# Patient Record
Sex: FEMALE | Race: WHITE | NOT HISPANIC OR LATINO | Employment: FULL TIME | URBAN - METROPOLITAN AREA
[De-identification: names, ages, dates, MRNs, and addresses within clinical notes are randomized per-mention and may not be internally consistent; named-entity substitution may affect disease eponyms.]

---

## 2017-05-31 DIAGNOSIS — J44.9 CHRONIC OBSTRUCTIVE PULMONARY DISEASE, UNSPECIFIED COPD TYPE (H): Primary | ICD-10-CM

## 2017-06-01 NOTE — TELEPHONE ENCOUNTER
albuterol (PROAIR HFA, PROVENTIL HFA, VENTOLIN HFA) 108 (90 BASE) MCG/ACT inhaler       Last Written Prescription Date: 9/22/15  Last Fill Quantity: 2 inhalers, # refills: 5    Last Office Visit with [facility] [place] [place], [facility] or [facility] prescribing provider:  6/6/16   Future Office Visit:       Date of Last Asthma Action Plan Letter:   There are no preventive care reminders to display for this patient.   Asthma Control Test: No flowsheet data found.    Date of Last Spirometry Test:   No results found for this or any previous visit.

## 2017-06-02 RX ORDER — ALBUTEROL SULFATE 90 UG/1
AEROSOL, METERED RESPIRATORY (INHALATION)
Qty: 36 G | Refills: 0 | Status: SHIPPED | OUTPATIENT
Start: 2017-06-02 | End: 2019-04-25

## 2017-06-02 NOTE — TELEPHONE ENCOUNTER
Patient needs to be scheduled for clinic visit. Ventolin refill ×1. Has not been seen since June 2016. Please assist in scheduling.    Jamie Melo MD  Please close encounter when call/work completed.

## 2017-06-02 NOTE — TELEPHONE ENCOUNTER
Routing refill request to provider for review/approval because:  Script is from 9/2015    Kari Valdez, RN, BSN

## 2019-03-11 ENCOUNTER — TELEPHONE (OUTPATIENT)
Dept: FAMILY MEDICINE | Facility: CLINIC | Age: 59
End: 2019-03-11

## 2019-03-11 NOTE — TELEPHONE ENCOUNTER
Reason for Call:  Other     Detailed comments: Norberto calling from [facility] states pt had purchased a oxygen machine for travel via telephone. [facility] is needing office notes stating that Yarely is on oxygen for COPD. Please advise      ED01 phone number [phone]    Fax [phone]    Phone Number Patient can be reached at: Home number on file [phone] (home)    Best Time: ANY    Can we leave a detailed message on this number? YES    Call taken on 3/11/2019 at 2:28 PM by Ijeoma Ramesh

## 2019-03-12 ENCOUNTER — TELEPHONE (OUTPATIENT)
Dept: FAMILY MEDICINE | Facility: CLINIC | Age: 59
End: 2019-03-12

## 2019-03-12 NOTE — TELEPHONE ENCOUNTER
Reason for call:  Form  Reason for Call:  Form, our goal is to have forms completed with 72 hours, however, some forms may require a visit or additional information.    Type of letter, form or note:  Medical    Who is the form from?: [employer]      Where did the form come from: form was faxed in    What clinic location was the form placed at?: [facility] - [phone]    Where the form was placed: 's in box     What number is listed as a contact on the form?: [phone]       Additional comments: Fax back to [phone]    Call taken on 3/12/2019 at 3:22 PM by Tien Kauffman

## 2019-04-23 NOTE — PROGRESS NOTES
SUBJECTIVE:   Yarely Dia is a 58 year old female who presents to clinic today for the following health issues:    History of Present Illness     COPD:     Current status of COPD symptom::  Slightly worsened    Status of fatigue and dyspnea with ambulation::  Better than usual    Status of dyspnea::  Slightly worsened    Increase or change in cough or sputum::  No    Fever::  No    Baseline ambulation without stopping to rest::  Less than 1 block    Number of flights of stairs without resting::  1    ER or UC Visits or Hospital admissions::  None    Diet:  Regular (no restrictions)  Taking medications regularly:  Yes  Additional concerns today:  No    Her son has noticed that her breathing is worse in the evening/night, which is why she got oxygen to sleep at night.   She sometimes feels depressed. Her breathing is her worst concern, she has noticed that some foods work/effect her breathing differently.     Hypertension- she would like to work on her weight vs starting on the pills. Denies chest pain, but positive for SOB. Her right foot is numb, due to her back.   Pt was supposed to see chiropractor about a month ago, but did not make it. Pt states that spearmint gum takes her lungs spasms away. Denies vaginal bleeding.     Ear  Pt states that her left ear has been bothering her a lot.     Tobacco abuse:  She is still smoking 1-1.5 packs a day. Pt quit for about a month, but has restarted after going back to work.     Social History  Pt's daughter was living on the streets, which is why she lost the custody of 2 of her daughters. She is still keeping her distance from her children. Pt is working 3 times a week.     Additional history: as documented    Reviewed and updated as needed this visit by clinical staff  Tobacco  Allergies  Meds  Med Hx  Surg Hx  Fam Hx  Soc Hx        Reviewed and updated as needed this visit by Provider       Patient Active Problem List   Diagnosis     Tobacco abuse     Cough      CARDIOVASCULAR SCREENING; LDL GOAL LESS THAN 130     Tingling in extremities     COPD (chronic obstructive pulmonary disease) (H)     Essential hypertension     Obesity (BMI 35.0-39.9) with comorbidity (H)     Past Surgical History:   Procedure Laterality Date     BACK SURGERY  1999    L4, L5, S1 fusion     BACK SURGERY  1997    herniated disc     GYN SURGERY  2007    hysterectomy- total and oophrectomy, uterine cancer     HEAD & NECK SURGERY  1969    tonsilectomy     HYSTERECTOMY, PAP NO LONGER INDICATED         Social History     Tobacco Use     Smoking status: Current Every Day Smoker     Packs/day: 1.00     Years: 39.00     Pack years: 39.00     Types: Cigarettes     Start date: 6/1/1979     Smokeless tobacco: Never Used   Substance Use Topics     Alcohol use: No     Family History   Problem Relation Age of Onset     C.A.D. Mother      Diabetes Mother      Hypertension Mother      Cancer Mother      C.A.D. Father      Diabetes Father      Hypertension Father      Gastrointestinal Disease Father         colon polyps     C.A.D. Maternal Grandmother      Diabetes Maternal Grandmother      Diabetes Maternal Grandfather      C.A.D. Maternal Grandfather      C.A.D. Paternal Grandmother      Diabetes Paternal Grandmother      Cancer Paternal Grandmother         lung CA     Diabetes Paternal Grandfather      Alzheimer Disease Maternal Aunt      Coronary Artery Disease No family hx of      Hyperlipidemia No family hx of      Ovarian Cancer No family hx of      Prostate Cancer No family hx of      Depression/Anxiety No family hx of      Thyroid Disease No family hx of      Asthma No family hx of      Known Genetic Syndrome No family hx of      Breast Cancer No family hx of      Cancer - colorectal No family hx of      Cerebrovascular Disease No family hx of      Anesthesia Reaction No family hx of      Osteoporosis No family hx of      Chemical Addiction No family hx of          Current Outpatient Medications   Medication  "Sig Dispense Refill     albuterol (VENTOLIN HFA) 108 (90 Base) MCG/ACT inhaler USE 2 PUFFS BY MOUTH INTO THE LUNGS EVERY 4 HOURS AS NEEDED FOR SHORTNESS OF BREATH/ DYSPNEA 36 g 3     Flaxseed, Linseed, (FLAXSEED OIL) 1000 MG CAPS Take 500 mg by mouth       fluticasone (FLOVENT DISKUS) 100 MCG/BLIST inhaler Inhale 1 puff into the lungs 2 times daily 1 each 11     IBUPROFEN PO Take  by mouth daily as needed.       ipratropium - albuterol 0.5 mg/2.5 mg/3 mL (DUONEB) 0.5-2.5 (3) MG/3ML nebulization Take 1 vial (3 mLs) by nebulization every 6 hours as needed for shortness of breath / dyspnea or wheezing 90 vial 3     lisinopril (PRINIVIL/ZESTRIL) 20 MG tablet Take 1 tablet (20 mg) by mouth daily 30 tablet 0     tiotropium (SPIRIVA HANDIHALER) 18 MCG inhaled capsule Inhale contents of one capsule daily. 90 capsule 3     No Known Allergies  Recent Labs   Lab Test 05/29/12  0800      HDL 43*   TRIG 118   CR 0.56   GFRESTIMATED >90   GFRESTBLACK >90   POTASSIUM 4.1      BP Readings from Last 3 Encounters:   04/25/19 150/74   06/06/16 136/86   09/22/15 130/86    Wt Readings from Last 3 Encounters:   04/25/19 88.9 kg (195 lb 14.4 oz)   06/06/16 82.6 kg (182 lb 1.6 oz)   09/22/15 83.4 kg (183 lb 14.4 oz)         Labs reviewed in EPIC    ROS:  Constitutional, neuro, ENT, endocrine, pulmonary, cardiac, gastrointestinal, genitourinary, musculoskeletal, integument and psychiatric systems are negative, except as otherwise noted.    This document serves as a record of the services and decisions personally performed and made by Susan Varma CNP. It was created on his/her behalf by Yung Mayer, trained medical scribe. The creation of this document is based the provider's statements to the medical scribes.    Charlie Mayer 1:53 PM, April 25, 2019    OBJECTIVE:                                                    /74   Pulse 95   Temp 97.9  F (36.6  C) (Temporal)   Resp 24   Ht 1.575 m (5' 2\")   Wt " 88.9 kg (195 lb 14.4 oz)   SpO2 92%   BMI 35.83 kg/m    Body mass index is 35.83 kg/m .  GENERAL APPEARANCE: healthy, alert and no distress  EYES: Eyes grossly normal to inspection, PERRL and conjunctivae and sclerae normal  HENT: ear canals and TM's normal and nose and mouth without ulcers or lesions  NECK: no adenopathy, no asymmetry, masses, or scars and thyroid normal to palpation  RESP: lungs clear to auscultation - no rales, rhonchi or wheezes  CV: regular rates and rhythm, normal S1 S2, no S3 or S4 and no murmur, click or rub  MS: extremities normal- no gross deformities noted  NEURO: Normal strength and tone, mentation intact and speech normal  PSYCH: mentation appears normal and affect normal/bright    Diagnostic test results:  No results found for this or any previous visit (from the past 24 hour(s)).     ASSESSMENT/PLAN:                                                        ICD-10-CM    1. Centrilobular emphysema (H) J43.2 COPD ACTION PLAN     fluticasone (FLOVENT DISKUS) 100 MCG/BLIST inhaler     tiotropium (SPIRIVA HANDIHALER) 18 MCG inhaled capsule   2. Tobacco use disorder F17.200 TOBACCO CESSATION ORDER FOR    3. Chronic obstructive pulmonary disease, unspecified COPD type (H) J44.9 albuterol (VENTOLIN HFA) 108 (90 Base) MCG/ACT inhaler   4. Personal history of tobacco use Z87.891 CT Chest Lung Cancer Scrn Low Dose wo   5. Essential hypertension I10 lisinopril (PRINIVIL/ZESTRIL) 20 MG tablet   6. Lumbar back pain with radiculopathy affecting right lower extremity M54.16 MR Lumbar Spine w/o Contrast   7. Morbid obesity (H) E66.01      Centrolobular emphysema- COPD action plan updated, will continue using Flovent Diskus 100MCG and Spiriva HandiHaler 18MCG. Refills ordered.      HTN- new diagnosis. Pt. Does not have good insurance- declined renal function today. Will re-assess with med on board in 1 month. Discussed weight loss, Lipid screening.     Tobacco Cessation- encouraged tobacco cessation,  pt does not want to be on Chantix, not wanting to quit at this time.     COPD- will continue treating with Ventolin  MCG, advised to continue using the oxygen machine.     Personal history of tobacco use- CT chest ordered. Info provided.     Hypertension- uncontrolled,strongly recommended she starts exercising and eating healthy foods to prevent being started on long term hypertension medication.     Follow up with Provider in 1 month for a BP recheck and med check.     The information in this document, created by the medical scribe for me, accurately reflects the services I personally performed and the decisions made by me. I have reviewed and approved this document for accuracy prior to leaving the patient care area.  Susan Varma CNP  2:18 PM, April 25, 2019    DEANNE Chen CNP  [facility]

## 2019-04-25 ENCOUNTER — OFFICE VISIT (OUTPATIENT)
Dept: FAMILY MEDICINE | Facility: CLINIC | Age: 59
End: 2019-04-25
Payer: COMMERCIAL

## 2019-04-25 VITALS
RESPIRATION RATE: 24 BRPM | TEMPERATURE: 97.9 F | SYSTOLIC BLOOD PRESSURE: 150 MMHG | DIASTOLIC BLOOD PRESSURE: 74 MMHG | BODY MASS INDEX: 36.05 KG/M2 | OXYGEN SATURATION: 92 % | HEIGHT: 62 IN | HEART RATE: 95 BPM | WEIGHT: 195.9 LBS

## 2019-04-25 DIAGNOSIS — J44.9 CHRONIC OBSTRUCTIVE PULMONARY DISEASE, UNSPECIFIED COPD TYPE (H): ICD-10-CM

## 2019-04-25 DIAGNOSIS — Z87.891 PERSONAL HISTORY OF TOBACCO USE: ICD-10-CM

## 2019-04-25 DIAGNOSIS — E66.01 MORBID OBESITY (H): ICD-10-CM

## 2019-04-25 DIAGNOSIS — F17.200 TOBACCO USE DISORDER: ICD-10-CM

## 2019-04-25 DIAGNOSIS — I10 ESSENTIAL HYPERTENSION: ICD-10-CM

## 2019-04-25 DIAGNOSIS — M54.16 LUMBAR BACK PAIN WITH RADICULOPATHY AFFECTING RIGHT LOWER EXTREMITY: ICD-10-CM

## 2019-04-25 DIAGNOSIS — J43.2 CENTRILOBULAR EMPHYSEMA (H): Primary | ICD-10-CM

## 2019-04-25 PROCEDURE — 99214 OFFICE O/P EST MOD 30 MIN: CPT | Performed by: NURSE PRACTITIONER

## 2019-04-25 RX ORDER — VITAMIN E 268 MG
500 CAPSULE ORAL
COMMUNITY

## 2019-04-25 RX ORDER — DIPHENHYDRAMINE HCL 25 MG
25 TABLET ORAL
COMMUNITY
Start: 2018-04-17 | End: 2019-04-25

## 2019-04-25 RX ORDER — ALBUTEROL SULFATE 90 UG/1
AEROSOL, METERED RESPIRATORY (INHALATION)
Qty: 36 G | Refills: 3 | Status: SHIPPED | OUTPATIENT
Start: 2019-04-25 | End: 2020-01-02

## 2019-04-25 RX ORDER — TIOTROPIUM BROMIDE 18 UG/1
CAPSULE ORAL; RESPIRATORY (INHALATION)
Qty: 90 CAPSULE | Refills: 3 | Status: SHIPPED | OUTPATIENT
Start: 2019-04-25 | End: 2020-05-14

## 2019-04-25 RX ORDER — LISINOPRIL 20 MG/1
20 TABLET ORAL DAILY
Qty: 30 TABLET | Refills: 0 | Status: SHIPPED | OUTPATIENT
Start: 2019-04-25 | End: 2019-06-11

## 2019-04-25 ASSESSMENT — MIFFLIN-ST. JEOR: SCORE: 1426.85

## 2019-04-25 ASSESSMENT — PAIN SCALES - GENERAL: PAINLEVEL: NO PAIN (0)

## 2019-04-25 NOTE — TELEPHONE ENCOUNTER
Reason for Call:  Medication or medication refill:    Do you use a [facility]?  Name of the pharmacy and phone number for the current request:  [facility] in [place]    Name of the medication requested: albuterol inhaler and solution    Other request: please re send Rx they have not received them.     Can we leave a detailed message on this number? YES    Phone number patient can be reached at: Home number on file [phone] (home)    Best Time: any    Call taken on 4/25/2019 at 4:32 PM by Radha Lopez

## 2019-04-25 NOTE — PATIENT INSTRUCTIONS

## 2019-04-25 NOTE — TELEPHONE ENCOUNTER
Left detailed message for pt.   Spoke to the pharmacy in [place] and was told to call the pharmacy in [place] to have them pulled the prescription and had fill there. When I called the [facility] in [place] they says they will call the patient to verify which inhaler patient want to filled.      John Levine MA

## 2019-04-25 NOTE — PROGRESS NOTES
Lung Cancer Screening Shared Decision Making Visit     Yarely Dia is eligible for lung cancer screening on the basis of the information provided in my signed lung cancer screening order.     I have discussed with patient the risks and benefits of screening for lung cancer with low-dose CT.     The risks include:  radiation exposure: one low dose chest CT has as much ionizing radiation as about 15 chest x-rays or 6 months of background radiation living in [place]    false positives: 96% of positive findings/nodules are NOT cancer, but some might still require additional diagnostic evaluation, including biopsy  over-diagnosis: some slow growing cancers that might never have been clinically significant will be detected and treated unnecessarily     The benefit of early detection of lung cancer is contingent upon adherence to annual screening or more frequent follow up if indicated.     Furthermore, reaping the benefits of screening requires Yarely Dia to be willing and physically able to undergo diagnostic procedures, if indicated. Although no specific guide is available for determining severity of comorbidities, it is reasonable to withhold screening in patients who have greater mortality risk from other diseases.     We did discuss that the only way to prevent lung cancer is to not smoke. Smoking cessation assistance was offered.    I did not offer risk estimation using a calculator such as this one:    ShouldIScreen

## 2019-04-25 NOTE — LETTER
My COPD Action Plan     Name: Yarely Dia    YOB: 1960   Date: 4/25/2019    My doctor: DEANNE Chen CNP   My clinic: [facility] [place]    [id number] [place] [address], [address]  [place] [address] [phone]  My Controller Medicine: Flovent  and Spiriva        My Rescue Medicine: Albuterol (Proair/Ventolin/Proventil) inhaler        My Flare Up Medicine: none ordered        My COPD Severity: Severe due to frequent exacerbations ( > 2 per year)      Use of Oxygen: titration- prev.orders Liters at night     Make sure you've had your pneumonia   vaccines.- done.           GREEN ZONE       Doing well today      Usual level of activity and exercise    Usual amount of cough and mucus    No shortness of breath    Usual level of health (thinking clearly, sleeping well, feel like eating) Actions:      Take daily medicines    Use oxygen as prescribed    Follow regular exercise and diet plan    Avoid cigarette smoke and other irritants that harm the lungs           YELLOW ZONE          Having a bad day or flare up      Short of breath more than usual    A lot more sputum (mucus) than usual    Sputum looks yellow, green, tan, brown or bloody    More coughing or wheezing    Fever or chills    Less energy; trouble completing activities    Trouble thinking or focusing    Using quick relief inhaler or nebulizer more often    Poor sleep; symptoms wake me up    Do not feel like eating Actions:      Get plenty of rest    Take daily medicines    Use quick relief inhaler every 4-5 hours    If you use oxygen, call you doctor to see if you should adjust your oxygen    Do breathing exercises or other things to help you relax    Let a loved one, friend or neighbor know you are feeling worse    Call your care team if you have 2 or more symptoms.  Start taking steroids or antibiotics if directed by your care team           RED ZONE       Need medical care now      Severe shortness of breath (feel you can't  breathe)    Fever, chills    Not enough breath to do any activity    Trouble coughing up mucus, walking or talking    Blood in mucus    Frequent coughing   Rescue medicines are not working    Not able to sleep because of breathing    Feel confused or drowsy    Chest pain    Actions:      Call your health care team.  If you cannot reach your care team, call 911 or go to the emergency room.        Annual Reminders:  Meet with Care Team, Flu Shot every Fall  Pharmacy:    [facility] [id number] [facility] [id number] AT [facility]  [address]  [facility] [id number] - [facility] [address] AT [facility] [address] [facility]

## 2019-04-27 ENCOUNTER — NURSE TRIAGE (OUTPATIENT)
Dept: NURSING | Facility: CLINIC | Age: 59
End: 2019-04-27

## 2019-04-27 NOTE — TELEPHONE ENCOUNTER
Reason for call;   Pt called . Seen on 4/25/19 by her PCP - SUGAR Varma CNP  at [facility] but Needs new  Rx Duo  neb   Sent to   [facility]  phone [phone] . Spoke to  Their  Pharmacist  Hans  And he suggest Pt call the [facility] provider group who order Rx   Provider Arturo Gould  Or go into be seen at   .   Recommendation / teaching ;  FNA left a message relaying this to Pt ,- to page on-call provider for provider MARCIA Gould to ask for refill or go into be seen.      Caller Verbalizes understanding and denies further questions and will call back if further symptoms to triage or questions  .  Laure Hoffman RN  - [facility] Nurse Advisor

## 2019-04-29 ENCOUNTER — TELEPHONE (OUTPATIENT)
Dept: FAMILY MEDICINE | Facility: CLINIC | Age: 59
End: 2019-04-29

## 2019-05-14 ENCOUNTER — ANCILLARY PROCEDURE (OUTPATIENT)
Dept: MRI IMAGING | Facility: CLINIC | Age: 59
End: 2019-05-14
Attending: NURSE PRACTITIONER
Payer: COMMERCIAL

## 2019-05-14 ENCOUNTER — TELEPHONE (OUTPATIENT)
Dept: FAMILY MEDICINE | Facility: CLINIC | Age: 59
End: 2019-05-14

## 2019-05-14 ENCOUNTER — ANCILLARY PROCEDURE (OUTPATIENT)
Dept: CT IMAGING | Facility: CLINIC | Age: 59
End: 2019-05-14
Attending: NURSE PRACTITIONER
Payer: COMMERCIAL

## 2019-05-14 DIAGNOSIS — Z87.891 PERSONAL HISTORY OF TOBACCO USE: ICD-10-CM

## 2019-05-14 DIAGNOSIS — M54.16 LUMBAR BACK PAIN WITH RADICULOPATHY AFFECTING RIGHT LOWER EXTREMITY: ICD-10-CM

## 2019-05-14 PROCEDURE — 72148 MRI LUMBAR SPINE W/O DYE: CPT | Performed by: RADIOLOGY

## 2019-05-14 PROCEDURE — G0297 LDCT FOR LUNG CA SCREEN: HCPCS | Performed by: RADIOLOGY

## 2019-05-14 NOTE — TELEPHONE ENCOUNTER
Mail report to pt.     Pt. Notified of MRI and spinal specialty referral. Pt. Will research specialist, and let me know if referral is needed.     Awaiting CT results.   Susan Varma

## 2019-05-15 ENCOUNTER — TELEPHONE (OUTPATIENT)
Dept: FAMILY MEDICINE | Facility: CLINIC | Age: 59
End: 2019-05-15

## 2019-05-15 NOTE — TELEPHONE ENCOUNTER
Pt informed.  She would like to know specific details on the number and location of nodules viewed in her lungs.  Provider, please review and advise.  ------    Notes recorded by Susan Varma APRN CNP on 5/14/2019 at 5:19 PM CDT  Call pt. Stable chest ct- nodules, COPD, and mucous seen in lung as previous. Continue yearly screening. Susan Varma

## 2019-05-15 NOTE — TELEPHONE ENCOUNTER
Read this to pt:    Nodules: A few  The largest and/or fastest 2 of these nodule(s) are as follows:     2 x 2 mm nodule in the left upper lobe on series 3 image 18,  centrally.     New 2 x 2 mm nodule in the right upper lobe on series 3 image 25  Posteriorly.      Mailed result and comparable result from 2015 to pt.

## 2019-05-17 NOTE — PROGRESS NOTES
SUBJECTIVE:   Yarely Dia is a 58 year old female who presents to clinic today for the following health issues:    Review results.     History of Present Illness     Back Pain:  She presents for follow up of back pain. Patient's back pain is a chronic problem.  Location of back pain:  Right lower back, right buttock and right hip  Description of back pain: gnawing  Back pain spreads: right foot    Since patient first noticed back pain, pain is: gradually worsening  Does back pain interfere with her job:  No      COPD:  She presents for follow up of COPD.  Overall, COPD symptoms are better since last visit. She has same as usual fatigue or shortness of breath with exertion and less than usual shortness of breath at rest.  She often coughs and does have change in sputum. No recent fever. She can walk less than 1 block without stopping to rest. She can walk 1 flights of stairs without resting.The patient has had no ED, urgent care, or hospital admissions because of COPD since the last visit.     Hypertension: She presents for follow up of hypertension.  She does not check blood pressure  regularly outside of the clinic. Outpatient blood pressures have not been over 140/90. She does not follow a low salt diet.     She eats 2-3 servings of fruits and vegetables daily.  She is taking medications regularly.    Pt has not noticed a big difference with her low energy, but is having less headaches. She has noticed a little bit of improvement with her blood pressure, but not as drastic. She denies having FHx of thyroid problems, but would like her level rechecked. She would like to go over her last CT scan. Denies chest pain and shortness of breath.     Additional history: as documented    Reviewed and updated as needed this visit by clinical staff  Tobacco  Allergies  Meds  Med Hx  Surg Hx  Fam Hx  Soc Hx      Reviewed and updated as needed this visit by Provider       Patient Active Problem List   Diagnosis      Tobacco abuse     Cough     CARDIOVASCULAR SCREENING; LDL GOAL LESS THAN 130     Tingling in extremities     COPD (chronic obstructive pulmonary disease) (H)     Essential hypertension     Obesity (BMI 35.0-39.9) with comorbidity (H)     Past Surgical History:   Procedure Laterality Date     BACK SURGERY  1999    L4, L5, S1 fusion     BACK SURGERY  1997    herniated disc     GYN SURGERY  2007    hysterectomy- total and oophrectomy, uterine cancer     HEAD & NECK SURGERY  1969    tonsilectomy     HYSTERECTOMY, PAP NO LONGER INDICATED         Social History     Tobacco Use     Smoking status: Current Every Day Smoker     Packs/day: 1.00     Years: 39.00     Pack years: 39.00     Types: Cigarettes     Start date: 6/1/1979     Smokeless tobacco: Never Used   Substance Use Topics     Alcohol use: No     Family History   Problem Relation Age of Onset     C.A.D. Mother      Diabetes Mother      Hypertension Mother      Cancer Mother      C.A.D. Father      Diabetes Father      Hypertension Father      Gastrointestinal Disease Father         colon polyps     C.A.D. Maternal Grandmother      Diabetes Maternal Grandmother      Diabetes Maternal Grandfather      C.A.D. Maternal Grandfather      C.A.D. Paternal Grandmother      Diabetes Paternal Grandmother      Cancer Paternal Grandmother         lung CA     Diabetes Paternal Grandfather      Alzheimer Disease Maternal Aunt      Coronary Artery Disease No family hx of      Hyperlipidemia No family hx of      Ovarian Cancer No family hx of      Prostate Cancer No family hx of      Depression/Anxiety No family hx of      Thyroid Disease No family hx of      Asthma No family hx of      Known Genetic Syndrome No family hx of      Breast Cancer No family hx of      Cancer - colorectal No family hx of      Cerebrovascular Disease No family hx of      Anesthesia Reaction No family hx of      Osteoporosis No family hx of      Chemical Addiction No family hx of          Current  Outpatient Medications   Medication Sig Dispense Refill     albuterol (VENTOLIN HFA) 108 (90 Base) MCG/ACT inhaler USE 2 PUFFS BY MOUTH INTO THE LUNGS EVERY 4 HOURS AS NEEDED FOR SHORTNESS OF BREATH/ DYSPNEA 36 g 3     amLODIPine (NORVASC) 5 MG tablet Take 1 tablet (5 mg) by mouth daily 30 tablet 1     Flaxseed, Linseed, (FLAXSEED OIL) 1000 MG CAPS Take 500 mg by mouth       fluticasone (FLOVENT DISKUS) 100 MCG/BLIST inhaler Inhale 1 puff into the lungs 2 times daily 1 each 11     IBUPROFEN PO Take  by mouth daily as needed.       ipratropium - albuterol 0.5 mg/2.5 mg/3 mL (DUONEB) 0.5-2.5 (3) MG/3ML nebulization Take 1 vial (3 mLs) by nebulization every 6 hours as needed for shortness of breath / dyspnea or wheezing 90 vial 3     lisinopril (PRINIVIL/ZESTRIL) 20 MG tablet Take 1 tablet (20 mg) by mouth daily 30 tablet 0     lisinopril (PRINIVIL/ZESTRIL) 40 MG tablet Take 1 tablet (40 mg) by mouth daily 90 tablet 3     tiotropium (SPIRIVA HANDIHALER) 18 MCG inhaled capsule Inhale contents of one capsule daily. 90 capsule 3     No Known Allergies  Recent Labs   Lab Test 05/29/12  0800      HDL 43*   TRIG 118   CR 0.56   GFRESTIMATED >90   GFRESTBLACK >90   POTASSIUM 4.1      BP Readings from Last 3 Encounters:   05/28/19 146/90   04/25/19 150/74   06/06/16 136/86    Wt Readings from Last 3 Encounters:   05/28/19 89.4 kg (197 lb)   04/25/19 88.9 kg (195 lb 14.4 oz)   06/06/16 82.6 kg (182 lb 1.6 oz)         ROS:  Constitutional, neuro, ENT, endocrine, pulmonary, cardiac, gastrointestinal, genitourinary, musculoskeletal, integument and psychiatric systems are negative, except as otherwise noted.    This document serves as a record of the services and decisions personally performed and made by Susan Varma CNP. It was created on his/her behalf by Huvaydo Rasulberdieva, trained medical scribe. The creation of this document is based the provider's statements to the medical scribes.    Charlie Mayer  "11:10 AM, May 28, 2019    OBJECTIVE:                                                    /90   Pulse 78   Temp 98.9  F (37.2  C) (Temporal)   Resp 20   Ht 1.588 m (5' 2.5\")   Wt 89.4 kg (197 lb)   LMP  (LMP Unknown)   SpO2 92%   BMI 35.46 kg/m    Body mass index is 35.46 kg/m .  GENERAL APPEARANCE: healthy, alert and no distress  HENT: ear canals and TM's normal and nose and mouth without ulcers or lesions  NECK: no adenopathy, no asymmetry, masses, or scars and thyroid normal to palpation  RESP: lungs clear to auscultation - no rales, rhonchi or wheezes  CV: regular rates and rhythm, normal S1 S2, no S3 or S4 and no murmur, click or rub  NEURO: Normal strength and tone, mentation intact and speech normal  PSYCH: mentation appears normal and affect normal/bright    Diagnostic test results:  Labs reviewed in Epic  No results found for this or any previous visit (from the past 24 hour(s)).     ASSESSMENT/PLAN:                                                        ICD-10-CM    1. Screening for thyroid disorder Z13.29 TSH with free T4 reflex   2. Essential hypertension I10 CBC with platelets and differential     Comprehensive metabolic panel     lisinopril (PRINIVIL/ZESTRIL) 40 MG tablet     amLODIPine (NORVASC) 5 MG tablet   3. Screening, lipid Z13.220 Lipid panel reflex to direct LDL Fasting     Screening for thyroid disorder- labs ordered, will notify with results.     Hypertension- uncontrolled, will increase the dose of Prinivil 40MG and add Norvasc 5MG. Schedule, benefits and side effects reviewed at length. Refills provided. Discussed starting on the new medication slowly and taper up to the fulls dose.     COPD reviewed CT scan, need for tobacco cessation, inhalers- is feeling better with current regimen.    HIV screen offered, pt declines at this time.     Lipid screening- will recheck LDL levels with labs today.     Follow up for a nurse only appointment in 2 weeks for a blood pressure " eda.    The information in this document, created by the medical scribe for me, accurately reflects the services I personally performed and the decisions made by me. I have reviewed and approved this document for accuracy prior to leaving the patient care area.  Susan Varma CNP  11:31 AM, May 28, 2019    DEANNE Chen CNP  [facility]

## 2019-05-28 ENCOUNTER — OFFICE VISIT (OUTPATIENT)
Dept: FAMILY MEDICINE | Facility: CLINIC | Age: 59
End: 2019-05-28
Payer: COMMERCIAL

## 2019-05-28 VITALS
HEART RATE: 78 BPM | TEMPERATURE: 98.9 F | HEIGHT: 63 IN | DIASTOLIC BLOOD PRESSURE: 90 MMHG | OXYGEN SATURATION: 92 % | SYSTOLIC BLOOD PRESSURE: 146 MMHG | BODY MASS INDEX: 34.91 KG/M2 | RESPIRATION RATE: 20 BRPM | WEIGHT: 197 LBS

## 2019-05-28 DIAGNOSIS — I10 ESSENTIAL HYPERTENSION: Primary | ICD-10-CM

## 2019-05-28 DIAGNOSIS — Z13.220 SCREENING, LIPID: ICD-10-CM

## 2019-05-28 DIAGNOSIS — J43.2 CENTRILOBULAR EMPHYSEMA (H): ICD-10-CM

## 2019-05-28 DIAGNOSIS — Z13.29 SCREENING FOR THYROID DISORDER: ICD-10-CM

## 2019-05-28 LAB
ALBUMIN SERPL-MCNC: 4.2 G/DL (ref 3.4–5)
ALP SERPL-CCNC: 85 U/L (ref 40–150)
ALT SERPL W P-5'-P-CCNC: 40 U/L (ref 0–50)
ANION GAP SERPL CALCULATED.3IONS-SCNC: 5 MMOL/L (ref 3–14)
AST SERPL W P-5'-P-CCNC: 23 U/L (ref 0–45)
BASOPHILS # BLD AUTO: 0 10E9/L (ref 0–0.2)
BASOPHILS NFR BLD AUTO: 0.3 %
BILIRUB SERPL-MCNC: 0.5 MG/DL (ref 0.2–1.3)
BUN SERPL-MCNC: 16 MG/DL (ref 7–30)
CALCIUM SERPL-MCNC: 8.8 MG/DL (ref 8.5–10.1)
CHLORIDE SERPL-SCNC: 106 MMOL/L (ref 94–109)
CHOLEST SERPL-MCNC: 193 MG/DL
CO2 SERPL-SCNC: 27 MMOL/L (ref 20–32)
CREAT SERPL-MCNC: 0.68 MG/DL (ref 0.52–1.04)
DIFFERENTIAL METHOD BLD: ABNORMAL
EOSINOPHIL # BLD AUTO: 0.1 10E9/L (ref 0–0.7)
EOSINOPHIL NFR BLD AUTO: 2.1 %
ERYTHROCYTE [DISTWIDTH] IN BLOOD BY AUTOMATED COUNT: 13.3 % (ref 10–15)
GFR SERPL CREATININE-BSD FRML MDRD: >90 ML/MIN/{1.73_M2}
GLUCOSE SERPL-MCNC: 102 MG/DL (ref 70–99)
HCT VFR BLD AUTO: 52.1 % (ref 35–47)
HDLC SERPL-MCNC: 41 MG/DL
HGB BLD-MCNC: 17.3 G/DL (ref 11.7–15.7)
LDLC SERPL CALC-MCNC: 131 MG/DL
LYMPHOCYTES # BLD AUTO: 2 10E9/L (ref 0.8–5.3)
LYMPHOCYTES NFR BLD AUTO: 30.2 %
MCH RBC QN AUTO: 31 PG (ref 26.5–33)
MCHC RBC AUTO-ENTMCNC: 33.2 G/DL (ref 31.5–36.5)
MCV RBC AUTO: 93 FL (ref 78–100)
MONOCYTES # BLD AUTO: 0.5 10E9/L (ref 0–1.3)
MONOCYTES NFR BLD AUTO: 7.7 %
NEUTROPHILS # BLD AUTO: 4 10E9/L (ref 1.6–8.3)
NEUTROPHILS NFR BLD AUTO: 59.7 %
NONHDLC SERPL-MCNC: 152 MG/DL
PLATELET # BLD AUTO: 249 10E9/L (ref 150–450)
POTASSIUM SERPL-SCNC: 4.6 MMOL/L (ref 3.4–5.3)
PROT SERPL-MCNC: 7.5 G/DL (ref 6.8–8.8)
RBC # BLD AUTO: 5.58 10E12/L (ref 3.8–5.2)
SODIUM SERPL-SCNC: 138 MMOL/L (ref 133–144)
TRIGL SERPL-MCNC: 107 MG/DL
TSH SERPL DL<=0.005 MIU/L-ACNC: 1.18 MU/L (ref 0.4–4)
WBC # BLD AUTO: 6.8 10E9/L (ref 4–11)

## 2019-05-28 PROCEDURE — 36415 COLL VENOUS BLD VENIPUNCTURE: CPT | Performed by: NURSE PRACTITIONER

## 2019-05-28 PROCEDURE — 85025 COMPLETE CBC W/AUTO DIFF WBC: CPT | Performed by: NURSE PRACTITIONER

## 2019-05-28 PROCEDURE — 80053 COMPREHEN METABOLIC PANEL: CPT | Performed by: NURSE PRACTITIONER

## 2019-05-28 PROCEDURE — 84443 ASSAY THYROID STIM HORMONE: CPT | Performed by: NURSE PRACTITIONER

## 2019-05-28 PROCEDURE — 80061 LIPID PANEL: CPT | Performed by: NURSE PRACTITIONER

## 2019-05-28 PROCEDURE — 99214 OFFICE O/P EST MOD 30 MIN: CPT | Performed by: NURSE PRACTITIONER

## 2019-05-28 RX ORDER — AMLODIPINE BESYLATE 5 MG/1
5 TABLET ORAL DAILY
Qty: 30 TABLET | Refills: 1 | Status: SHIPPED | OUTPATIENT
Start: 2019-05-28 | End: 2020-05-14

## 2019-05-28 RX ORDER — LISINOPRIL 20 MG/1
20 TABLET ORAL DAILY
Qty: 30 TABLET | Refills: 0 | Status: CANCELLED | OUTPATIENT
Start: 2019-05-28

## 2019-05-28 RX ORDER — LISINOPRIL 40 MG/1
40 TABLET ORAL DAILY
Qty: 90 TABLET | Refills: 3 | Status: SHIPPED | OUTPATIENT
Start: 2019-05-28 | End: 2020-05-14

## 2019-05-28 ASSESSMENT — MIFFLIN-ST. JEOR: SCORE: 1439.78

## 2019-05-28 ASSESSMENT — PAIN SCALES - GENERAL: PAINLEVEL: NO PAIN (0)

## 2019-05-30 ENCOUNTER — TELEPHONE (OUTPATIENT)
Dept: FAMILY MEDICINE | Facility: CLINIC | Age: 59
End: 2019-05-30

## 2019-05-30 DIAGNOSIS — Z53.20 STATIN DECLINED: Primary | ICD-10-CM

## 2019-05-30 NOTE — TELEPHONE ENCOUNTER
Susan Varma APRN CNP Lewis, Kara L, APRN CNP             The 10-year ASCVD risk score (Walteranant CRUMP JrKymberly, et al., 2013) is: 11.7%     Values used to calculate the score:       Age: 58 years       Sex: Female       Is Non- : No       Diabetic: No       Tobacco smoker: Yes       Systolic Blood Pressure: 146 mmHg       Is BP treated: Yes       HDL Cholesterol: 41 mg/dL       Total Cholesterol: 193 mg/dL     Results:     Cholesterol is above goal and heart risk tool is showing increased cardiovascular risk. I am recommending a cholesterol medication at this point. Please let me know if you will take this. If ou start it, we check fasting labs in a month- then yearly. Hemoglobin is high from your smoking- same as it was 4 years ago, treatment is quitting smoking. Other labs in good range.   Susan Varma             Left detailed message.

## 2019-06-04 ENCOUNTER — TELEPHONE (OUTPATIENT)
Dept: FAMILY MEDICINE | Facility: CLINIC | Age: 59
End: 2019-06-04

## 2019-06-04 DIAGNOSIS — I10 ESSENTIAL HYPERTENSION: Primary | ICD-10-CM

## 2019-06-11 ENCOUNTER — ALLIED HEALTH/NURSE VISIT (OUTPATIENT)
Dept: FAMILY MEDICINE | Facility: CLINIC | Age: 59
End: 2019-06-11
Payer: COMMERCIAL

## 2019-06-11 VITALS — HEART RATE: 80 BPM | DIASTOLIC BLOOD PRESSURE: 84 MMHG | SYSTOLIC BLOOD PRESSURE: 136 MMHG

## 2019-06-11 DIAGNOSIS — I10 ESSENTIAL HYPERTENSION: ICD-10-CM

## 2019-06-11 DIAGNOSIS — I10 ESSENTIAL HYPERTENSION: Primary | ICD-10-CM

## 2019-06-11 LAB
ANION GAP SERPL CALCULATED.3IONS-SCNC: 4 MMOL/L (ref 3–14)
BUN SERPL-MCNC: 20 MG/DL (ref 7–30)
CALCIUM SERPL-MCNC: 8.8 MG/DL (ref 8.5–10.1)
CHLORIDE SERPL-SCNC: 104 MMOL/L (ref 94–109)
CO2 SERPL-SCNC: 31 MMOL/L (ref 20–32)
CREAT SERPL-MCNC: 0.65 MG/DL (ref 0.52–1.04)
GFR SERPL CREATININE-BSD FRML MDRD: >90 ML/MIN/{1.73_M2}
GLUCOSE SERPL-MCNC: 100 MG/DL (ref 70–99)
POTASSIUM SERPL-SCNC: 4.3 MMOL/L (ref 3.4–5.3)
SODIUM SERPL-SCNC: 139 MMOL/L (ref 133–144)

## 2019-06-11 PROCEDURE — 80048 BASIC METABOLIC PNL TOTAL CA: CPT | Performed by: NURSE PRACTITIONER

## 2019-06-11 PROCEDURE — 99207 ZZC NO CHARGE NURSE ONLY: CPT

## 2019-06-11 PROCEDURE — 36415 COLL VENOUS BLD VENIPUNCTURE: CPT | Performed by: NURSE PRACTITIONER

## 2019-06-11 NOTE — PROGRESS NOTES
Yarely Dia is a 58 year old patient who comes in today for a Blood Pressure check.  Initial BP:  /84   Pulse 80   LMP  (LMP Unknown)      80  Disposition: follow-up as previously indicated by provider    Roslyn Resendiz, RN, BSN

## 2019-12-16 ENCOUNTER — TELEPHONE (OUTPATIENT)
Dept: FAMILY MEDICINE | Facility: CLINIC | Age: 59
End: 2019-12-16

## 2019-12-16 DIAGNOSIS — J44.9 COPD (CHRONIC OBSTRUCTIVE PULMONARY DISEASE) (H): Primary | ICD-10-CM

## 2019-12-16 NOTE — TELEPHONE ENCOUNTER
Reason for Call:  Medication or medication refill:    Do you use a [facility]?  Name of the pharmacy and phone number for the current request:  [phone] [facility]    Name of the medication requested: nebulizer machine broke and needs a new order for one    Other request: NA    Can we leave a detailed message on this number? YES    Phone number patient can be reached at: Other phone number:  [phone]    Best Time: any    Call taken on 12/16/2019 at 2:23 PM by Pauline Gonzalez

## 2020-01-02 DIAGNOSIS — J44.9 CHRONIC OBSTRUCTIVE PULMONARY DISEASE, UNSPECIFIED COPD TYPE (H): ICD-10-CM

## 2020-01-02 DIAGNOSIS — J44.1 COPD EXACERBATION (H): ICD-10-CM

## 2020-01-02 DIAGNOSIS — J43.9 PULMONARY EMPHYSEMA, UNSPECIFIED EMPHYSEMA TYPE (H): ICD-10-CM

## 2020-01-02 RX ORDER — ALBUTEROL SULFATE 90 UG/1
AEROSOL, METERED RESPIRATORY (INHALATION)
Qty: 36 G | Refills: 1 | Status: SHIPPED | OUTPATIENT
Start: 2020-01-02 | End: 2020-05-14

## 2020-01-02 NOTE — TELEPHONE ENCOUNTER
Pending Prescriptions:                       Disp   Refills    albuterol (VENTOLIN HFA) 108 (90 Base) MC*36 g   1            Sig: USE 2 PUFFS BY MOUTH INTO THE LUNGS EVERY 4 HOURS           AS NEEDED FOR SHORTNESS OF BREATH/ DYSPNEA    Prescription [place] per [facility] [place].    Roslyn Resendiz, RN, BSN

## 2020-01-02 NOTE — TELEPHONE ENCOUNTER
Patient is needing her albuterol- sulfate solution for her inhaler - pharmacy pended.  Questions give her a call

## 2020-01-03 RX ORDER — IPRATROPIUM BROMIDE AND ALBUTEROL SULFATE 2.5; .5 MG/3ML; MG/3ML
SOLUTION RESPIRATORY (INHALATION)
Qty: 270 ML | Refills: 0 | Status: SHIPPED | OUTPATIENT
Start: 2020-01-03 | End: 2020-04-30

## 2020-01-03 NOTE — TELEPHONE ENCOUNTER
Pending Prescriptions:                       Disp   Refills    ipratropium - albuterol 0.5 mg/2.5 mg/3 m*270 mL              Sig: INHALE 3 ML VIA A NEBULIZER EVERY 8 HOURS AS           NEEDED    Prescription [place] per [facility] [place].    Roslyn Resendiz, RN, BSN

## 2020-04-29 DIAGNOSIS — J44.1 COPD EXACERBATION (H): ICD-10-CM

## 2020-04-29 DIAGNOSIS — J43.9 PULMONARY EMPHYSEMA, UNSPECIFIED EMPHYSEMA TYPE (H): ICD-10-CM

## 2020-04-30 RX ORDER — IPRATROPIUM BROMIDE AND ALBUTEROL SULFATE 2.5; .5 MG/3ML; MG/3ML
SOLUTION RESPIRATORY (INHALATION)
Qty: 270 ML | Refills: 0 | Status: SHIPPED | OUTPATIENT
Start: 2020-04-30 | End: 2020-09-11

## 2020-05-11 DIAGNOSIS — J43.2 CENTRILOBULAR EMPHYSEMA (H): ICD-10-CM

## 2020-05-11 NOTE — TELEPHONE ENCOUNTER
Pending Prescriptions:                       Disp   Refills    FLOVENT DISKUS 100 MCG/BLIST inhaler [Phar*                Sig: INHALE 1 PUFF INTO THE LUNGS TWICE DAILY    LOV 5/28/2019, please schedule a visit    Roslyn Resendiz MSN, RN

## 2020-05-12 NOTE — PROGRESS NOTES
"Yarely Dia is a 59 year old female who is being evaluated via a billable telephone visit.      The patient has been notified of following:     \"This telephone visit will be conducted via a call between you and your physician/provider. We have found that certain health care needs can be provided without the need for a physical exam.  This service lets us provide the care you need with a short phone conversation.  If a prescription is necessary we can send it directly to your pharmacy.  If lab work is needed we can place an order for that and you can then stop by our lab to have the test done at a later time.    Telephone visits are billed at different rates depending on your insurance coverage. During this emergency period, for some insurers they may be billed the same as an in-person visit.  Please reach out to your insurance provider with any questions.    If during the course of the call the physician/provider feels a telephone visit is not appropriate, you will not be charged for this service.\"    Patient has given verbal consent for Telephone visit?  Yes    What phone number would you like to be contacted at? [phone]    How would you like to obtain your AVS? Mail a copy    Subjective     Yarely Dia is a 59 year old female who presents to clinic today for the following health issues:    HPI  Medication Followup of spiriva, albuterol, flovent, and proair    Taking Medication as prescribed: yes    Side Effects:  None    Medication Helping Symptoms:  yes       HTN- not taking medications-\"I hate it\". Swelling on Norvasc. Still swollen on just the Lisinopril.   Does not check BP.     Has chronic LBP with toe numbness on right. Has tingling, feels nerves are improving a bit.   Had toenail injury of unknown origin. Did not have back surgery last year.     Still smoking- does not want chest CT- paid out of pocket.     \"I don't even care about my lungs at this point.\"    Has stress incontinence any time she " is breathing hard or air quality os poor and coughing lots.         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     Overall, how are your COPD symptoms since your last clinic visit?  No change    How much fatigue or shortness of breath do you have when you are walking?  Same as usual    How much shortness of breath do you have when you are resting?  None    How often do you cough? Sometimes, takes Mucinex, generic.         Have you noticed any change in your sputum/phlegm?  No    Have you experienced a recent fever? No    Please describe how far you can walk without stopping to rest:  Less than 1 block    How many flights of stairs are you able to walk up without stopping?  2, can't do over 20 steps.    Have you had any Emergency Room Visits, Urgent Care Visits, or Hospital Admissions because of your COPD since your last office visit?  No    History   Smoking Status     Current Every Day Smoker     Packs/day: 1.00     Years: 39.00     Types: Cigarettes     Start date: 6/1/1979   Smokeless Tobacco     Never Used     No results found for: FEV1, [id number]      Patient Active Problem List   Diagnosis     Tobacco abuse     Cough     CARDIOVASCULAR SCREENING; LDL GOAL LESS THAN 130     Tingling in extremities     COPD (chronic obstructive pulmonary disease) (H)     Essential hypertension     Obesity (BMI 35.0-39.9) with comorbidity (H)     Statin declined     Past Surgical History:   Procedure Laterality Date     BACK SURGERY  1999    L4, L5, S1 fusion     BACK SURGERY  1997    herniated disc     GYN SURGERY  05/21/2008    hysterectomy- total and oophrectomy, uterine cancer     HEAD & NECK SURGERY  1969    tonsilectomy     HYSTERECTOMY, PAP NO LONGER INDICATED         Social History     Tobacco Use     Smoking status: Current Every Day Smoker     Packs/day: 1.00     Years: 39.00     Pack years: 39.00     Types: Cigarettes     Start date: 6/1/1979     Smokeless tobacco: Never Used    Substance Use Topics     Alcohol use: No     Family History   Problem Relation Age of Onset     Diabetes Mother      Hypertension Mother      Cancer Mother      Atrial fibrillation Mother      C.A.D. Father      Diabetes Father      Hypertension Father      Gastrointestinal Disease Father         colon polyps     C.A.D. Maternal Grandmother      Diabetes Maternal Grandmother      Diabetes Maternal Grandfather      C.A.D. Maternal Grandfather      C.A.D. Paternal Grandmother      Diabetes Paternal Grandmother      Cancer Paternal Grandmother         lung CA     Diabetes Paternal Grandfather      Alzheimer Disease Maternal Aunt      Coronary Artery Disease No family hx of      Hyperlipidemia No family hx of      Ovarian Cancer No family hx of      Prostate Cancer No family hx of      Depression/Anxiety No family hx of      Thyroid Disease No family hx of      Asthma No family hx of      Known Genetic Syndrome No family hx of      Breast Cancer No family hx of      Cancer - colorectal No family hx of      Cerebrovascular Disease No family hx of      Anesthesia Reaction No family hx of      Osteoporosis No family hx of      Chemical Addiction No family hx of            Reviewed and updated as needed this visit by Provider         Review of Systems   Constitutional, HEENT, cardiovascular, pulmonary, gi and gu systems are negative, except as otherwise noted.       Objective   Reported vitals:  LMP  (LMP Unknown)    healthy, alert and no distress  PSYCH: Alert and oriented times 3; coherent speech, normal   rate and volume, able to articulate logical thoughts, able   to abstract reason, no tangential thoughts, no hallucinations   or delusions  Her affect is normal and pleasant  RESP: No cough, no audible wheezing, able to talk in full sentences  Remainder of exam unable to be completed due to telephone visits    Diagnostic Test Results:  Labs reviewed in Epic        Assessment/Plan:  1. Essential  "hypertension  Uncontrolled, needs labs- pt. Declines. Declines meds. As above, discussed high CV risk.     2. Centrilobular emphysema (H)  Stable, severe disease.   Pt. Declines cancer screening.   - fluticasone-salmeterol (ADVAIR) 250-50 MCG/DOSE inhaler; Inhale 1 puff into the lungs every 12 hours  Dispense: 3 Inhaler; Refill: 3  - tiotropium (SPIRIVA HANDIHALER) 18 MCG inhaled capsule; Inhale contents of one capsule daily.  Dispense: 90 capsule; Refill: 3    3. Cough  -as above, tobacco cessation encouraged.     4. Statin declined  As above    5. Drug declined by patient  - declines HTN meds or working to look at new meds without SA. Continue to work on healthy habits.     6. Chronic obstructive pulmonary disease, unspecified COPD type (H)  Has DNR/DNI paperwork- will look at referral for this.   - albuterol (VENTOLIN HFA) 108 (90 Base) MCG/ACT inhaler; USE 2 PUFFS BY MOUTH INTO THE LUNGS EVERY 4 HOURS AS NEEDED FOR SHORTNESS OF BREATH/ DYSPNEA  Dispense: 3 Inhaler; Refill: 1    7. LEBLANC (dyspnea on exertion)  declines stress testing, Discussed CV risk, unclear etiology, and discussed heart strain with breathing.       Pt. wanting OTC life scan testing.   Discussed medications/testing costs, low insurance coverage.  Declined CT Chest, financial aid offered - declined, as I think  High risk for heart attack and stroke discussed- at length.   Pt. Does not want stating, HTN meds.   Just wants refills of inhalers.   High covid risk, has DNR/DNI -sister has a copy. On drivers license. Is fine with this. Thinks HOLLIS Souza has copy.        Discussed urology referral- pt. Declined.     Tobacco cessation- declines aid, tried Chantix in the past.   \" I don't wanna quit.\"    Return in about 6 months (around 11/14/2020) for re-check office visit.      Phone call duration:  23 minutes    DEANNE Chen CNP        "

## 2020-05-12 NOTE — TELEPHONE ENCOUNTER
.   Patient arrived to the floor from ER via stretcher in stable condition. O2 @ 3/L in use. Pt with a history of dementia she is alert oriented to person only. PIV intact flushes well. Pt son at bedside he is able to answer admission questions. Pt NPO for upcoming surgery in AM. Calderon catheter in place draining clear yellow urine. Pt given 1 time dose of Lovenox for VTE. Traction placed to left leg. SR up x 2, bed in lowest position, call light in reach. Will continue to monitor.

## 2020-05-14 ENCOUNTER — VIRTUAL VISIT (OUTPATIENT)
Dept: FAMILY MEDICINE | Facility: OTHER | Age: 60
End: 2020-05-14
Payer: COMMERCIAL

## 2020-05-14 DIAGNOSIS — I10 ESSENTIAL HYPERTENSION: ICD-10-CM

## 2020-05-14 DIAGNOSIS — J43.2 CENTRILOBULAR EMPHYSEMA (H): Primary | ICD-10-CM

## 2020-05-14 DIAGNOSIS — Z53.20 DRUG DECLINED BY PATIENT: ICD-10-CM

## 2020-05-14 DIAGNOSIS — Z71.89 COUNSELING REGARDING ADVANCED DIRECTIVES: ICD-10-CM

## 2020-05-14 DIAGNOSIS — J44.9 CHRONIC OBSTRUCTIVE PULMONARY DISEASE, UNSPECIFIED COPD TYPE (H): ICD-10-CM

## 2020-05-14 DIAGNOSIS — Z53.20 STATIN DECLINED: ICD-10-CM

## 2020-05-14 DIAGNOSIS — R06.09 DOE (DYSPNEA ON EXERTION): ICD-10-CM

## 2020-05-14 DIAGNOSIS — R05.9 COUGH: ICD-10-CM

## 2020-05-14 PROCEDURE — 99214 OFFICE O/P EST MOD 30 MIN: CPT | Mod: TEL | Performed by: NURSE PRACTITIONER

## 2020-05-14 RX ORDER — TIOTROPIUM BROMIDE 18 UG/1
CAPSULE ORAL; RESPIRATORY (INHALATION)
Qty: 90 CAPSULE | Refills: 3 | Status: SHIPPED | OUTPATIENT
Start: 2020-05-14 | End: 2021-04-12

## 2020-05-14 RX ORDER — ALBUTEROL SULFATE 90 UG/1
AEROSOL, METERED RESPIRATORY (INHALATION)
Qty: 3 INHALER | Refills: 1 | Status: SHIPPED | OUTPATIENT
Start: 2020-05-14 | End: 2021-04-12

## 2020-05-14 NOTE — Clinical Note
Pt. States she is DNR/DNI- I do not see paperwork through [facility], am I just allowed to order it through [facility]?  Thanks,   DEANNE Chen CNP

## 2020-09-09 DIAGNOSIS — J44.1 COPD EXACERBATION (H): ICD-10-CM

## 2020-09-09 DIAGNOSIS — J43.9 PULMONARY EMPHYSEMA, UNSPECIFIED EMPHYSEMA TYPE (H): ICD-10-CM

## 2020-09-11 RX ORDER — IPRATROPIUM BROMIDE AND ALBUTEROL SULFATE 2.5; .5 MG/3ML; MG/3ML
SOLUTION RESPIRATORY (INHALATION)
Qty: 270 ML | Refills: 0 | Status: SHIPPED | OUTPATIENT
Start: 2020-09-11 | End: 2020-12-09

## 2020-10-15 NOTE — PROGRESS NOTES
Subjective     Yarely Dia is a 60 year old female who presents to clinic today for the following health issues:  Patient's eye doctor told her he thinks she may have diabetes due to having vision changes more often in the last year. No spots by the retina.    History of Present Illness       Diabetes:   She presents for follow up of diabetes.  She is not checking blood glucose. She is concerned about none and other. She is having numbness in feet, blurry vision and weight gain.     She exercises with enough effort to increase her heart rate 9 or less minutes per day.  She exercises with enough effort to increase her heart rate 3 or less days per week.   She is taking medications regularly.              Patient continues to smoke.  Medical costs are of high concern to the patient.  She is having a normal blood pressure today.  Does not want additional testing if she can avoid it.  Declines any more lung CTs or chest CTs.  She is concerned about diabetes that ran in her family.  With the eye changes she is open to treating it.  She does not want to use metformin as it has implications in the media regarding potential cancer causes.    States her breathing is still difficult with exertion.  She does occasionally use her oxygen at home.  She does not use it at work.  She feels her  is trying to get her fired.  She has a difficult time staying awake.  She is up every 2 hours at night with difficulty breathing.  She does currently sleep in a chair.  Her son states she is a very loud snorer.  States if she exerts herself with walking a distance over approximately 200 feet, or is in a hurry, it will take her approximately 20 minutes to recover with an inhaler use.  Otherwise over 30 minutes if she does not use her inhaler.  Patient is not open to a mammogram or colorectal screening.      Review of Systems   Constitutional, HEENT, cardiovascular, pulmonary, gi and gu systems are negative, except as  "otherwise noted.      Objective    /82 (BP Location: Left arm, Patient Position: Sitting, Cuff Size: Adult Large)   Pulse 105   Temp 96.9  F (36.1  C) (Temporal)   Resp 16   Ht 1.585 m (5' 2.4\")   Wt 91.2 kg (201 lb 1.6 oz)   LMP  (LMP Unknown)   SpO2 94%   BMI 36.31 kg/m    Body mass index is 36.31 kg/m .  Physical Exam   GENERAL: healthy, alert and no distress  EYES: Eyes grossly normal to inspection, PERRL and conjunctivae and sclerae normal  HENT: ear canals and TM's normal, nose and mouth without ulcers or lesions  NECK: no adenopathy, no asymmetry, masses, or scars and thyroid normal to palpation  RESP: Decreased lung sounds throughout.  Breathing is slightly notable at the beginning of the visit, but columns to normal when she has been seated.  CV: regular rate and rhythm, normal S1 S2, no S3 or S4, no murmur, click or rub, no peripheral edema and peripheral pulses strong  NEURO: Normal strength and tone, mentation intact and speech normal  PSYCH: mentation appears normal, affect normal/bright    Results for orders placed or performed in visit on 10/19/20 (from the past 24 hour(s))   Hemoglobin A1c   Result Value Ref Range    Hemoglobin A1C 5.9 (H) 0 - 5.6 %   Glucose   Result Value Ref Range    Glucose 149 (H) 70 - 99 mg/dL   Lipid panel reflex to direct LDL Non-fasting   Result Value Ref Range    Cholesterol 183 <200 mg/dL    Triglycerides 173 (H) <150 mg/dL    HDL Cholesterol 36 (L) >49 mg/dL    LDL Cholesterol Calculated 112 (H) <100 mg/dL    Non HDL Cholesterol 147 (H) <130 mg/dL   CBC with platelets and differential   Result Value Ref Range    WBC 9.1 4.0 - 11.0 10e9/L    RBC Count 5.35 (H) 3.8 - 5.2 10e12/L    Hemoglobin 16.9 (H) 11.7 - 15.7 g/dL    Hematocrit 51.6 (H) 35.0 - 47.0 %    MCV 96 78 - 100 fl    MCH 31.6 26.5 - 33.0 pg    MCHC 32.8 31.5 - 36.5 g/dL    RDW 13.0 10.0 - 15.0 %    Platelet Count 258 150 - 450 10e9/L    % Neutrophils 67.1 %    % Lymphocytes 22.4 %    % Monocytes " "8.4 %    % Eosinophils 1.9 %    % Basophils 0.2 %    Absolute Neutrophil 6.1 1.6 - 8.3 10e9/L    Absolute Lymphocytes 2.0 0.8 - 5.3 10e9/L    Absolute Monocytes 0.8 0.0 - 1.3 10e9/L    Absolute Eosinophils 0.2 0.0 - 0.7 10e9/L    Absolute Basophils 0.0 0.0 - 0.2 10e9/L    Diff Method Automated Method            Assessment & Plan     Yarely was seen today for diabetes.    Diagnoses and all orders for this visit:    Screening for diabetes mellitus  -     Hemoglobin A1c  -     Glucose    Prediabetes    Morbid obesity (H)    Snoring  -     SLEEP EVALUATION & MANAGEMENT REFERRAL - [facility]  [phone] (Age 15 and up); Future    Lipid screening  -     Lipid panel reflex to direct LDL Non-fasting    Screening for deficiency anemia  -     CBC with platelets and differential    Centrilobular emphysema (H)    Essential hypertension    Tobacco abuse         Tobacco Cessation:   reports that she has been smoking cigarettes. She started smoking about 41 years ago. She has a 39.00 pack-year smoking history. She has never used smokeless tobacco.  Tobacco Cessation Action Plan: Information offered: Patient not interested at this time      BMI:   Estimated body mass index is 36.31 kg/m  as calculated from the following:    Height as of this encounter: 1.585 m (5' 2.4\").    Weight as of this encounter: 91.2 kg (201 lb 1.6 oz).   Weight management plan: Discussed healthy diet and exercise guidelines         Discussed implications of prediabetes.  Patient will try to lose weight.  We will put in a sleep referral, as to aid with her difficulty sleeping at night.  Try to optimize her breathing is much as possible.  As above patient declines imaging would not like work-up of her blood pressure, colonoscopy, or mammogram. She is open to lipid screening.   Does not want to revisit pulmonology.  No symptoms of bacterial infection.  Previous Mycobacterium noted on her chest CTs, discussed warning signs of " weight loss and fevers.  Unfortunately, this is a difficult case as patient is not wanting much intervention, is concerned about cost and is understanding about her lung status.  She seems mostly optimized on medication she can afford with her inhalers.  At rest oxygen saturation is normal, continue oxygen use with exertion.  Patient declined vaccines.  Continued tobacco cessation encouraged.  Discussed the nature of her prediabetes with her lab results.  Also her cholesterol is not to goal.  Patient declines statin.    Return in about 6 months (around 4/19/2021) for re-check office visit.    DEANNE Chen CNP  [facility]

## 2020-10-19 ENCOUNTER — OFFICE VISIT (OUTPATIENT)
Dept: FAMILY MEDICINE | Facility: CLINIC | Age: 60
End: 2020-10-19
Payer: COMMERCIAL

## 2020-10-19 VITALS
RESPIRATION RATE: 16 BRPM | OXYGEN SATURATION: 94 % | DIASTOLIC BLOOD PRESSURE: 82 MMHG | BODY MASS INDEX: 37.01 KG/M2 | WEIGHT: 201.1 LBS | TEMPERATURE: 96.9 F | HEART RATE: 105 BPM | HEIGHT: 62 IN | SYSTOLIC BLOOD PRESSURE: 138 MMHG

## 2020-10-19 DIAGNOSIS — E66.01 MORBID OBESITY (H): ICD-10-CM

## 2020-10-19 DIAGNOSIS — Z72.0 TOBACCO ABUSE: ICD-10-CM

## 2020-10-19 DIAGNOSIS — Z13.1 SCREENING FOR DIABETES MELLITUS: Primary | ICD-10-CM

## 2020-10-19 DIAGNOSIS — Z13.220 LIPID SCREENING: ICD-10-CM

## 2020-10-19 DIAGNOSIS — J43.2 CENTRILOBULAR EMPHYSEMA (H): ICD-10-CM

## 2020-10-19 DIAGNOSIS — R06.83 SNORING: ICD-10-CM

## 2020-10-19 DIAGNOSIS — I10 ESSENTIAL HYPERTENSION: ICD-10-CM

## 2020-10-19 DIAGNOSIS — Z13.0 SCREENING FOR DEFICIENCY ANEMIA: ICD-10-CM

## 2020-10-19 DIAGNOSIS — R73.03 PREDIABETES: ICD-10-CM

## 2020-10-19 LAB
BASOPHILS # BLD AUTO: 0 10E9/L (ref 0–0.2)
BASOPHILS NFR BLD AUTO: 0.2 %
CHOLEST SERPL-MCNC: 183 MG/DL
DIFFERENTIAL METHOD BLD: ABNORMAL
EOSINOPHIL # BLD AUTO: 0.2 10E9/L (ref 0–0.7)
EOSINOPHIL NFR BLD AUTO: 1.9 %
ERYTHROCYTE [DISTWIDTH] IN BLOOD BY AUTOMATED COUNT: 13 % (ref 10–15)
GLUCOSE SERPL-MCNC: 149 MG/DL (ref 70–99)
HBA1C MFR BLD: 5.9 % (ref 0–5.6)
HCT VFR BLD AUTO: 51.6 % (ref 35–47)
HDLC SERPL-MCNC: 36 MG/DL
HGB BLD-MCNC: 16.9 G/DL (ref 11.7–15.7)
LDLC SERPL CALC-MCNC: 112 MG/DL
LYMPHOCYTES # BLD AUTO: 2 10E9/L (ref 0.8–5.3)
LYMPHOCYTES NFR BLD AUTO: 22.4 %
MCH RBC QN AUTO: 31.6 PG (ref 26.5–33)
MCHC RBC AUTO-ENTMCNC: 32.8 G/DL (ref 31.5–36.5)
MCV RBC AUTO: 96 FL (ref 78–100)
MONOCYTES # BLD AUTO: 0.8 10E9/L (ref 0–1.3)
MONOCYTES NFR BLD AUTO: 8.4 %
NEUTROPHILS # BLD AUTO: 6.1 10E9/L (ref 1.6–8.3)
NEUTROPHILS NFR BLD AUTO: 67.1 %
NONHDLC SERPL-MCNC: 147 MG/DL
PLATELET # BLD AUTO: 258 10E9/L (ref 150–450)
RBC # BLD AUTO: 5.35 10E12/L (ref 3.8–5.2)
TRIGL SERPL-MCNC: 173 MG/DL
WBC # BLD AUTO: 9.1 10E9/L (ref 4–11)

## 2020-10-19 PROCEDURE — 85025 COMPLETE CBC W/AUTO DIFF WBC: CPT | Performed by: NURSE PRACTITIONER

## 2020-10-19 PROCEDURE — 83036 HEMOGLOBIN GLYCOSYLATED A1C: CPT | Performed by: NURSE PRACTITIONER

## 2020-10-19 PROCEDURE — 36415 COLL VENOUS BLD VENIPUNCTURE: CPT | Performed by: NURSE PRACTITIONER

## 2020-10-19 PROCEDURE — 82947 ASSAY GLUCOSE BLOOD QUANT: CPT | Performed by: NURSE PRACTITIONER

## 2020-10-19 PROCEDURE — 80061 LIPID PANEL: CPT | Performed by: NURSE PRACTITIONER

## 2020-10-19 PROCEDURE — 99214 OFFICE O/P EST MOD 30 MIN: CPT | Performed by: NURSE PRACTITIONER

## 2020-10-19 ASSESSMENT — MIFFLIN-ST. JEOR: SCORE: 1446.81

## 2020-12-08 DIAGNOSIS — J44.1 COPD EXACERBATION (H): ICD-10-CM

## 2020-12-08 DIAGNOSIS — J43.9 PULMONARY EMPHYSEMA, UNSPECIFIED EMPHYSEMA TYPE (H): ICD-10-CM

## 2020-12-09 RX ORDER — IPRATROPIUM BROMIDE AND ALBUTEROL SULFATE 2.5; .5 MG/3ML; MG/3ML
SOLUTION RESPIRATORY (INHALATION)
Qty: 270 ML | Refills: 0 | Status: SHIPPED | OUTPATIENT
Start: 2020-12-09 | End: 2021-03-09

## 2020-12-09 NOTE — TELEPHONE ENCOUNTER
Prescription approved per [facility] Refill Protocol.  Snow Patten RN

## 2021-03-09 DIAGNOSIS — J43.9 PULMONARY EMPHYSEMA, UNSPECIFIED EMPHYSEMA TYPE (H): ICD-10-CM

## 2021-03-09 DIAGNOSIS — J44.1 COPD EXACERBATION (H): ICD-10-CM

## 2021-03-09 RX ORDER — IPRATROPIUM BROMIDE AND ALBUTEROL SULFATE 2.5; .5 MG/3ML; MG/3ML
SOLUTION RESPIRATORY (INHALATION)
Qty: 270 ML | Refills: 1 | Status: SHIPPED | OUTPATIENT
Start: 2021-03-09 | End: 2021-09-30

## 2021-03-09 NOTE — TELEPHONE ENCOUNTER
Prescription approved per [facility] Refill Protocol.    Naima Villafana RN on 3/9/2021 at 2:45 PM

## 2021-03-30 ENCOUNTER — TELEPHONE (OUTPATIENT)
Dept: FAMILY MEDICINE | Facility: CLINIC | Age: 61
End: 2021-03-30

## 2021-03-30 NOTE — TELEPHONE ENCOUNTER
Patient Quality Outreach Summary      Summary:    Patient is due/failing the following:   Physical and Fasting labs    Type of outreach:    Phone, left message for patient/parent to call back.    Questions for provider review:    None                                                                                                                    Aida Diaz CMA       Chart routed to Care Team.

## 2021-04-01 NOTE — TELEPHONE ENCOUNTER
Next 5 appointments (look out 90 days)    Apr 12, 2021  1:20 PM  PHYSICAL with DEANNE Lock CNP  [facility] ([facility] ) [address], [address]  [place] [id number] [phone]        Aida Diaz CMA

## 2021-04-05 NOTE — PROGRESS NOTES
SUBJECTIVE:   CC: Yarely Dia is an 60 year old woman who presents for medication check. She does not want a physical.       Patient has been advised of split billing requirements and indicates understanding: Yes       Healthy Habits:     Getting at least 3 servings of Calcium per day:  Yes    Bi-annual eye exam:  Yes    Dental care twice a year:  Yes    Sleep apnea or symptoms of sleep apnea:  Daytime drowsiness    Diet:  Regular (no restrictions)    Frequency of exercise:  None    Taking medications regularly:  Yes    Medication side effects:  None    PHQ-2 Total Score: 3    Additional concerns today:  No    COPD is better. SOB going up stairs. Can get her work and house cares done. Still smoking. Does not want Chantix.   High life stress.   She has high insurance costs and does not want PE, or specialty referrals. Aware of her risks and severity of disease. Feels she does not need nor want home oxygen.     HTN- stable, denies chest pain. Not able to exercise much. Weight is up this spring and trying to cut back.         Today's PHQ-2 Score:   PHQ-2 ( 1999 Pfizer) 4/12/2021   Q1: Little interest or pleasure in doing things 3   Q2: Feeling down, depressed or hopeless 0   PHQ-2 Score 3   Q1: Little interest or pleasure in doing things Nearly every day   Q2: Feeling down, depressed or hopeless Not at all   PHQ-2 Score 3           Social History     Tobacco Use     Smoking status: Current Every Day Smoker     Packs/day: 1.00     Years: 39.00     Pack years: 39.00     Types: Cigarettes     Start date: 6/1/1979     Smokeless tobacco: Never Used   Substance Use Topics     Alcohol use: No       Alcohol Use 4/12/2021   Prescreen: >3 drinks/day or >7 drinks/week? Not Applicable        Reviewed and updated as needed this visit by clinical staff  Tobacco  Allergies  Meds   Med Hx  Surg Hx  Fam Hx  Soc Hx        Reviewed and updated as needed this visit by Provider                    Review of Systems  "  Constitutional: Negative for chills.   HENT: Positive for congestion.    Respiratory: Positive for cough.    Cardiovascular: Negative for chest pain.   Gastrointestinal: Negative for abdominal pain, constipation, diarrhea and hematochezia.   Genitourinary: Negative for hematuria.          OBJECTIVE:   /80 (BP Location: Left arm, Patient Position: Chair, Cuff Size: Adult Large)   Pulse 85   Temp 97.5  F (36.4  C) (Temporal)   Resp 21   Ht 1.568 m (5' 1.75\")   Wt 89.4 kg (197 lb)   LMP  (Exact Date)   SpO2 92%   Breastfeeding No   BMI 36.32 kg/m    Physical Exam  GENERAL healthy, alert and no distress  EYES sclera clear, PERRLA  HENT: nose,mouth without ulcers but oropharynx is red with white patching, Normal ear canals, TM's pearly grey, normal light reflex bilaterally   NECK: no adenopathy, no asymmetry, masses, or scars and thyroid normal to palpation  RESP: Clear to auscultation, short breath phases, exp. Wheezes scattered  CV: RRR, no murmur, no edema  NEURO: NEGATIVE, alert/oriented to person, location and time  PSYCH: normal pleasant affect       Diagnostic Test Results:  Labs reviewed in Epic  Results for orders placed or performed in visit on 04/12/21   Basic metabolic panel     Status: None   Result Value Ref Range    Sodium 136 133 - 144 mmol/L    Potassium 4.1 3.4 - 5.3 mmol/L    Chloride 103 94 - 109 mmol/L    Carbon Dioxide 30 20 - 32 mmol/L    Anion Gap 3 3 - 14 mmol/L    Glucose 83 70 - 99 mg/dL    Urea Nitrogen 17 7 - 30 mg/dL    Creatinine 0.69 0.52 - 1.04 mg/dL    GFR Estimate >90 >60 mL/min/[1.73_m2]    GFR Estimate If Black >90 >60 mL/min/[1.73_m2]    Calcium 8.7 8.5 - 10.1 mg/dL       ASSESSMENT/PLAN:       ICD-10-CM    1. Centrilobular emphysema (H)  J43.2 tiotropium (SPIRIVA HANDIHALER) 18 MCG inhaled capsule   2. Morbid obesity (H)  E66.01    3. Chronic obstructive pulmonary disease, unspecified COPD type (H)  J44.9 albuterol (VENTOLIN HFA) 108 (90 Base) MCG/ACT inhaler     " "fluticasone-salmeterol (ADVAIR) 500-50 MCG/DOSE inhaler   4. Essential hypertension  I10 lisinopril (ZESTRIL) 40 MG tablet     Basic metabolic panel   5. Thrush  B37.0 clotrimazole (MYCELEX) 10 MG lozenge         COUNSELING:  Reviewed preventive health counseling, as reflected in patient instructions       Regular exercise       Healthy diet/nutrition  Mammogram declined       Immunizations    Declined all.              Osteoporosis prevention/bone health       Colon cancer screening- declined  Declined follow-up CT imaging, tobacco cessation encouraged. Has daughter that is homeless with a 4 month old boy. Discussed mood management.   Meds refilled.   Treating for thrush.   Advanced directive counseling.   Updating labs.   Continue plan, re-check in 6 months.     Estimated body mass index is 36.32 kg/m  as calculated from the following:    Height as of this encounter: 1.568 m (5' 1.75\").    Weight as of this encounter: 89.4 kg (197 lb).    Weight management plan: Discussed healthy diet and exercise guidelines    She reports that she has been smoking cigarettes. She started smoking about 41 years ago. She has a 39.00 pack-year smoking history. She has never used smokeless tobacco.  Tobacco Cessation Action Plan:   Information offered: Patient not interested at this time      Counseling Resources:  ATP IV Guidelines  Pooled Cohorts Equation Calculator  Breast Cancer Risk Calculator  BRCA-Related Cancer Risk Assessment: FHS-7 Tool  FRAX Risk Assessment  ICSI Preventive Guidelines  Dietary Guidelines for Americans, 2010  USDA's MyPlate  ASA Prophylaxis  Lung CA Screening    DEANNE Chen [facility]  Answers for HPI/ROS submitted by the patient on 4/12/2021   Annual Exam:  If you checked off any problems, how difficult have these problems made it for you to do your work, take care of things at home, or get along with other people?: Not difficult at all  PHQ9 TOTAL SCORE: 1    "

## 2021-04-07 ENCOUNTER — IMMUNIZATION (OUTPATIENT)
Dept: PEDIATRICS | Facility: CLINIC | Age: 61
End: 2021-04-07
Payer: COMMERCIAL

## 2021-04-07 PROCEDURE — 0001A PR COVID VAC PFIZER DIL RECON 30 MCG/0.3 ML IM: CPT

## 2021-04-07 PROCEDURE — 91300 PR COVID VAC PFIZER DIL RECON 30 MCG/0.3 ML IM: CPT

## 2021-04-12 ENCOUNTER — OFFICE VISIT (OUTPATIENT)
Dept: FAMILY MEDICINE | Facility: CLINIC | Age: 61
End: 2021-04-12
Payer: COMMERCIAL

## 2021-04-12 VITALS
OXYGEN SATURATION: 92 % | DIASTOLIC BLOOD PRESSURE: 80 MMHG | TEMPERATURE: 97.5 F | HEART RATE: 85 BPM | SYSTOLIC BLOOD PRESSURE: 136 MMHG | BODY MASS INDEX: 36.25 KG/M2 | HEIGHT: 62 IN | WEIGHT: 197 LBS | RESPIRATION RATE: 21 BRPM

## 2021-04-12 DIAGNOSIS — J43.2 CENTRILOBULAR EMPHYSEMA (H): Primary | ICD-10-CM

## 2021-04-12 DIAGNOSIS — J44.9 CHRONIC OBSTRUCTIVE PULMONARY DISEASE, UNSPECIFIED COPD TYPE (H): ICD-10-CM

## 2021-04-12 DIAGNOSIS — B37.0 THRUSH: ICD-10-CM

## 2021-04-12 DIAGNOSIS — I10 ESSENTIAL HYPERTENSION: ICD-10-CM

## 2021-04-12 DIAGNOSIS — E66.01 MORBID OBESITY (H): ICD-10-CM

## 2021-04-12 LAB
ANION GAP SERPL CALCULATED.3IONS-SCNC: 3 MMOL/L (ref 3–14)
BUN SERPL-MCNC: 17 MG/DL (ref 7–30)
CALCIUM SERPL-MCNC: 8.7 MG/DL (ref 8.5–10.1)
CHLORIDE SERPL-SCNC: 103 MMOL/L (ref 94–109)
CO2 SERPL-SCNC: 30 MMOL/L (ref 20–32)
CREAT SERPL-MCNC: 0.69 MG/DL (ref 0.52–1.04)
GFR SERPL CREATININE-BSD FRML MDRD: >90 ML/MIN/{1.73_M2}
GLUCOSE SERPL-MCNC: 83 MG/DL (ref 70–99)
POTASSIUM SERPL-SCNC: 4.1 MMOL/L (ref 3.4–5.3)
SODIUM SERPL-SCNC: 136 MMOL/L (ref 133–144)

## 2021-04-12 PROCEDURE — 36415 COLL VENOUS BLD VENIPUNCTURE: CPT | Performed by: NURSE PRACTITIONER

## 2021-04-12 PROCEDURE — 80048 BASIC METABOLIC PNL TOTAL CA: CPT | Performed by: NURSE PRACTITIONER

## 2021-04-12 PROCEDURE — 99214 OFFICE O/P EST MOD 30 MIN: CPT | Performed by: NURSE PRACTITIONER

## 2021-04-12 RX ORDER — ALBUTEROL SULFATE 90 UG/1
AEROSOL, METERED RESPIRATORY (INHALATION)
Qty: 18 G | Refills: 1 | Status: SHIPPED | OUTPATIENT
Start: 2021-04-12 | End: 2022-06-06

## 2021-04-12 RX ORDER — LISINOPRIL 40 MG/1
40 TABLET ORAL DAILY
Qty: 90 TABLET | Refills: 3 | Status: SHIPPED | OUTPATIENT
Start: 2021-04-12 | End: 2022-06-06

## 2021-04-12 RX ORDER — LISINOPRIL 40 MG/1
40 TABLET ORAL DAILY
COMMUNITY
End: 2021-04-12

## 2021-04-12 RX ORDER — TIOTROPIUM BROMIDE 18 UG/1
CAPSULE ORAL; RESPIRATORY (INHALATION)
Qty: 90 CAPSULE | Refills: 3 | Status: SHIPPED | OUTPATIENT
Start: 2021-04-12 | End: 2022-04-26

## 2021-04-12 ASSESSMENT — ENCOUNTER SYMPTOMS
COUGH: 1
DIARRHEA: 0
HEMATURIA: 0
CONSTIPATION: 0
CHILLS: 0
HEMATOCHEZIA: 0
ABDOMINAL PAIN: 0

## 2021-04-12 ASSESSMENT — PATIENT HEALTH QUESTIONNAIRE - PHQ9
10. IF YOU CHECKED OFF ANY PROBLEMS, HOW DIFFICULT HAVE THESE PROBLEMS MADE IT FOR YOU TO DO YOUR WORK, TAKE CARE OF THINGS AT HOME, OR GET ALONG WITH OTHER PEOPLE: NOT DIFFICULT AT ALL
SUM OF ALL RESPONSES TO PHQ QUESTIONS 1-9: 1
SUM OF ALL RESPONSES TO PHQ QUESTIONS 1-9: 1

## 2021-04-12 ASSESSMENT — MIFFLIN-ST. JEOR: SCORE: 1417.87

## 2021-04-12 ASSESSMENT — PAIN SCALES - GENERAL: PAINLEVEL: NO PAIN (0)

## 2021-04-12 NOTE — LETTER
April 13, 2021    Yarely Dia  [id number] [facility] [address]      Dear ,    We are writing to inform you of your test results.    Your test results fall within the expected range(s) or remain unchanged from previous results.  Please continue with current treatment plan.  Resulted Orders   Basic metabolic panel   Result Value Ref Range    Sodium 136 133 - 144 mmol/L    Potassium 4.1 3.4 - 5.3 mmol/L    Chloride 103 94 - 109 mmol/L    Carbon Dioxide 30 20 - 32 mmol/L    Anion Gap 3 3 - 14 mmol/L    Glucose 83 70 - 99 mg/dL    Urea Nitrogen 17 7 - 30 mg/dL    Creatinine 0.69 0.52 - 1.04 mg/dL    GFR Estimate >90 >60 mL/min/[1.73_m2]      Comment:      Non  GFR Calc  Starting 12/18/2018, serum creatinine based estimated GFR (eGFR) will be   calculated using the Chronic Kidney Disease Epidemiology Collaboration   (CKD-EPI) equation.      GFR Estimate If Black >90 >60 mL/min/[1.73_m2]      Comment:       GFR Calc  Starting 12/18/2018, serum creatinine based estimated GFR (eGFR) will be   calculated using the Chronic Kidney Disease Epidemiology Collaboration   (CKD-EPI) equation.      Calcium 8.7 8.5 - 10.1 mg/dL     If you have any questions or concerns, please call the clinic at the number listed above.  Sincerely,    DEANNE Lock CNP

## 2021-04-13 ASSESSMENT — PATIENT HEALTH QUESTIONNAIRE - PHQ9: SUM OF ALL RESPONSES TO PHQ QUESTIONS 1-9: 1

## 2021-04-14 ENCOUNTER — TELEPHONE (OUTPATIENT)
Dept: FAMILY MEDICINE | Facility: CLINIC | Age: 61
End: 2021-04-14

## 2021-04-14 RX ORDER — CLOTRIMAZOLE 10 MG/1
10 LOZENGE ORAL
Qty: 70 TROCHE | Refills: 0 | Status: SHIPPED | OUTPATIENT
Start: 2021-04-14 | End: 2024-05-02

## 2021-04-15 NOTE — TELEPHONE ENCOUNTER
Call pt. I prescribed Trouches for her thrush. I forgot to order them on Monday, but they should be ready for her today. Send myy apologies.   DEANNE Chen CNP

## 2021-04-28 ENCOUNTER — IMMUNIZATION (OUTPATIENT)
Dept: PEDIATRICS | Facility: CLINIC | Age: 61
End: 2021-04-28
Attending: INTERNAL MEDICINE
Payer: COMMERCIAL

## 2021-04-28 PROCEDURE — 0002A PR COVID VAC PFIZER DIL RECON 30 MCG/0.3 ML IM: CPT

## 2021-04-28 PROCEDURE — 91300 PR COVID VAC PFIZER DIL RECON 30 MCG/0.3 ML IM: CPT

## 2021-09-29 DIAGNOSIS — J43.9 PULMONARY EMPHYSEMA, UNSPECIFIED EMPHYSEMA TYPE (H): ICD-10-CM

## 2021-09-29 DIAGNOSIS — J44.1 COPD EXACERBATION (H): ICD-10-CM

## 2021-09-30 RX ORDER — IPRATROPIUM BROMIDE AND ALBUTEROL SULFATE 2.5; .5 MG/3ML; MG/3ML
SOLUTION RESPIRATORY (INHALATION)
Qty: 90 ML | Refills: 0 | Status: SHIPPED | OUTPATIENT
Start: 2021-09-30 | End: 2021-11-02

## 2021-09-30 NOTE — TELEPHONE ENCOUNTER
Pending Prescriptions:                       Disp   Refills    ipratropium - albuterol 0.5 mg/2.5 mg/3 mL*270 mL 1        Sig: USE 3 ML VIA NEBULIZER EVERY 8 HOURS AS NEEDED    Routing refill request to provider for review/approval because:  A break in medication

## 2021-10-27 ENCOUNTER — TELEPHONE (OUTPATIENT)
Dept: FAMILY MEDICINE | Facility: CLINIC | Age: 61
End: 2021-10-27
Payer: COMMERCIAL

## 2021-10-27 NOTE — TELEPHONE ENCOUNTER
Patient Quality Outreach Summary      Summary:    Patient is due for the following:   Physical- schedule a yearly physical    Type of outreach:    Phone, left message for patient/parent to call back.    Questions for provider review:    None                                                                                                                    Aida Diaz CMA       Chart routed to Care Team.

## 2021-11-01 DIAGNOSIS — J44.1 COPD EXACERBATION (H): ICD-10-CM

## 2021-11-01 DIAGNOSIS — J43.9 PULMONARY EMPHYSEMA, UNSPECIFIED EMPHYSEMA TYPE (H): ICD-10-CM

## 2021-11-02 RX ORDER — IPRATROPIUM BROMIDE AND ALBUTEROL SULFATE 2.5; .5 MG/3ML; MG/3ML
SOLUTION RESPIRATORY (INHALATION)
Qty: 90 ML | Refills: 0 | Status: SHIPPED | OUTPATIENT
Start: 2021-11-02 | End: 2022-01-20

## 2021-11-02 NOTE — TELEPHONE ENCOUNTER
Pending Prescriptions:                       Disp   Refills    ipratropium - albuterol 0.5 mg/2.5 mg/3 m*90 mL  0            Sig: USE 3 ML VIA NEBULIZER EVERY 8 HOURS AS NEEDED    Medication is being filled for 1 time francis refill only due to:  Patient is due for physical.     Please call and help schedule.  Thank you!    ROBERTO HillN, RN, PHN  [facility]  November 2, 2021

## 2021-11-04 ENCOUNTER — VIRTUAL VISIT (OUTPATIENT)
Dept: FAMILY MEDICINE | Facility: CLINIC | Age: 61
End: 2021-11-04
Payer: COMMERCIAL

## 2021-11-04 DIAGNOSIS — J44.9 CHRONIC OBSTRUCTIVE PULMONARY DISEASE, UNSPECIFIED COPD TYPE (H): ICD-10-CM

## 2021-11-04 PROCEDURE — 99213 OFFICE O/P EST LOW 20 MIN: CPT | Mod: TEL | Performed by: NURSE PRACTITIONER

## 2021-11-04 NOTE — PROGRESS NOTES
"Yarely is a 61 year old who is being evaluated via a billable telephone visit.      What phone number would you like to be contacted at? [phone]  How would you like to obtain your AVS? Mail a copy    Assessment & Plan         Chronic obstructive pulmonary disease, unspecified COPD type (H)   controlled at her baseline.   Tobacco cessation as able.   Discussed switching regimen to Combivent BID inhaler and albuterol mid day, prn.   Trying to avoid extra moisutre in lungs.   Return to clinic with any new or worsening symptoms, and as needed.   - ipratropium-albuterol (COMBIVENT RESPIMAT)  MCG/ACT inhaler; Inhale 1 puff into the lungs 2 times daily             BMI:   Estimated body mass index is 36.32 kg/m  as calculated from the following:    Height as of 4/12/21: 1.568 m (5' 1.75\").    Weight as of 4/12/21: 89.4 kg (197 lb).   Weight management plan: healthy habits    MEDICATIONS:  Continue current medications without change    No follow-ups on file.    DEANNE Chen [facility] Pritchett is a 61 year old who presents for the following health issues  accompanied by her self.    HPI     Hypertension Follow-up      Do you check your blood pressure regularly outside of the clinic? No     Are you following a low salt diet? Yes    Are your blood pressures ever more than 140 on the top number (systolic) OR more   than 90 on the bottom number (diastolic), for example 140/90? No    COPD Follow-Up    Overall, how are your COPD symptoms since your last clinic visit?  No change    How much fatigue or shortness of breath do you have when you are walking?  Same as usual    How much shortness of breath do you have when you are resting?  None    How often do you cough? Often    Have you noticed any change in your sputum/phlegm?  No    Have you experienced a recent fever? No    Please describe how far you can walk without stopping to rest:  Less than 1 block    How many flights of " stairs are you able to walk up without stopping?  1    Have you had any Emergency Room Visits, Urgent Care Visits, or Hospital Admissions because of your COPD since your last office visit?  No    History   Smoking Status     Current Every Day Smoker     Packs/day: 1.00     Years: 39.00     Types: Cigarettes     Start date: 6/1/1979   Smokeless Tobacco     Never Used     No results found for: FEV1, SHI1EDZ      How many servings of fruits and vegetables do you eat daily?  2-3    On average, how many sweetened beverages do you drink each day (Examples: soda, juice, sweet tea, etc.  Do NOT count diet or artificially sweetened beverages)?   0    How many days per week do you exercise enough to make your heart beat faster? 3 or less    How many minutes a day do you exercise enough to make your heart beat faster? 9 or less    How many days per week do you miss taking your medication? 0    Using Duoneb BID and albuterol mid-day while at work and before long walk to car.   On [place] in am with [employer].     Still smoking.   Moderate life stressors.     Poor health insurance, does not want any HCM.    Review of Systems   Constitutional, HEENT, cardiovascular, pulmonary, gi and gu systems are negative, except as otherwise noted.      Objective           Vitals:  No vitals were obtained today due to virtual visit.    Physical Exam   healthy, alert and no distress  PSYCH: Alert and oriented times 3; coherent speech, normal   rate and volume, able to articulate logical thoughts, able   to abstract reason, no tangential thoughts, no hallucinations   or delusions  Her affect is normal and pleasant  RESP: No cough, no audible wheezing, able to talk in full sentences  Remainder of exam unable to be completed due to telephone visits                Phone call duration: 9 minutes

## 2022-01-20 DIAGNOSIS — J44.1 COPD EXACERBATION (H): ICD-10-CM

## 2022-01-20 DIAGNOSIS — J43.9 PULMONARY EMPHYSEMA, UNSPECIFIED EMPHYSEMA TYPE (H): ICD-10-CM

## 2022-01-20 RX ORDER — IPRATROPIUM BROMIDE AND ALBUTEROL SULFATE 2.5; .5 MG/3ML; MG/3ML
SOLUTION RESPIRATORY (INHALATION)
Qty: 90 ML | Refills: 1 | Status: SHIPPED | OUTPATIENT
Start: 2022-01-20 | End: 2022-06-06

## 2022-04-25 DIAGNOSIS — J43.2 CENTRILOBULAR EMPHYSEMA (H): ICD-10-CM

## 2022-04-26 RX ORDER — TIOTROPIUM BROMIDE 18 UG/1
CAPSULE ORAL; RESPIRATORY (INHALATION)
Qty: 90 CAPSULE | Refills: 0 | Status: SHIPPED | OUTPATIENT
Start: 2022-04-26 | End: 2022-06-06

## 2022-04-26 NOTE — TELEPHONE ENCOUNTER
Pending Prescriptions:                       Disp   Refills    tiotropium (SPIRIVA HANDIHALER) 18 MCG in*90 cap*0            Sig: INHALE THE CONTENTS OF 1 CAPSULE VIA INHALATION           DEVICE DAILY    Medication is being filled for 1 time francis refill only due to:  Patient is due for physical exam (around 10/12/21).    Please call and help schedule.  Thank you!

## 2022-04-26 NOTE — TELEPHONE ENCOUNTER
Left message for pt to return call, when call is returned give information below and help schedule physical.      Alejandrina Underwood CMA ([facility])

## 2022-05-12 ENCOUNTER — TELEPHONE (OUTPATIENT)
Dept: FAMILY MEDICINE | Facility: CLINIC | Age: 62
End: 2022-05-12

## 2022-05-12 ENCOUNTER — OFFICE VISIT (OUTPATIENT)
Dept: FAMILY MEDICINE | Facility: OTHER | Age: 62
End: 2022-05-12
Payer: COMMERCIAL

## 2022-05-12 VITALS
SYSTOLIC BLOOD PRESSURE: 138 MMHG | HEIGHT: 62 IN | BODY MASS INDEX: 37.1 KG/M2 | HEART RATE: 95 BPM | OXYGEN SATURATION: 93 % | WEIGHT: 201.6 LBS | TEMPERATURE: 98.9 F | DIASTOLIC BLOOD PRESSURE: 74 MMHG

## 2022-05-12 DIAGNOSIS — Z12.31 VISIT FOR SCREENING MAMMOGRAM: ICD-10-CM

## 2022-05-12 DIAGNOSIS — Z72.0 TOBACCO ABUSE: ICD-10-CM

## 2022-05-12 DIAGNOSIS — E66.01 MORBID OBESITY (H): ICD-10-CM

## 2022-05-12 DIAGNOSIS — R73.03 PREDIABETES: ICD-10-CM

## 2022-05-12 DIAGNOSIS — Z12.11 SCREEN FOR COLON CANCER: ICD-10-CM

## 2022-05-12 DIAGNOSIS — H25.013 CORTICAL AGE-RELATED CATARACT OF BOTH EYES: ICD-10-CM

## 2022-05-12 DIAGNOSIS — Z01.818 PREOP GENERAL PHYSICAL EXAM: Primary | ICD-10-CM

## 2022-05-12 DIAGNOSIS — J43.1 PANLOBULAR EMPHYSEMA (H): ICD-10-CM

## 2022-05-12 DIAGNOSIS — I10 ESSENTIAL HYPERTENSION: ICD-10-CM

## 2022-05-12 DIAGNOSIS — Z11.4 SCREENING FOR HIV (HUMAN IMMUNODEFICIENCY VIRUS): ICD-10-CM

## 2022-05-12 PROCEDURE — 99214 OFFICE O/P EST MOD 30 MIN: CPT | Performed by: PHYSICIAN ASSISTANT

## 2022-05-12 ASSESSMENT — PAIN SCALES - GENERAL: PAINLEVEL: NO PAIN (0)

## 2022-05-12 NOTE — CONFIDENTIAL NOTE
Left VOICEMAIL at both contact numbers to reschedule pt. Please help patient reschedule the canceled appt below:       - Provider:  Kari Ro           - Canceled appt details         Date: May 11         Time:  10:10 a.m.         Type of visit:  Preop      - Reason for change/cancellation: clinician out       Notes to consider when rescheduling:  SURGERY MAY 17        Please close encounter once the patient has been rescheduled

## 2022-05-12 NOTE — PROGRESS NOTES
[address]  [facility] [address]  Phone: [phone]  Primary Provider: Susan Varma  Pre-op Performing Provider: JESSY SALAZAR      PREOPERATIVE EVALUATION:  Today's date: 5/12/2022    Yarely Dia is a 61 year old female who presents for a preoperative evaluation.    Surgical Information:  Surgery/Procedure: Bilateral cataract  Surgery Location: [facility]  Surgeon: UNK  Surgery Date: May 17, 2022 and May 31, 2022  Time of Surgery: UNK  Where patient plans to recover: At home with family  Fax number for surgical facility:  [phone]    Type of Anesthesia Anticipated: Choice    Assessment & Plan     The proposed surgical procedure is considered LOW risk.    Preop general physical exam  Cortical age-related cataract of both eyes  Panlobular emphysema (H)  Obesity (BMI 35.0-39.9) with comorbidity (H)  Prediabetes  Essential hypertension  Tobacco abuse  Patient is cleared for surgical intervention based on assessment today.  We must note that one of her surgeries is over 2 weeks away and she certainly could develop any sort of upper respiratory infection between now and then.  The surgical team will need to be responsible for obtaining proper clearance COVID-19 testing based on surgery dates.  Encourage patient to use her albuterol inhaler for treatment the day of surgery.  Careful follow-up during surgical intervention and in the postoperative phase due to obstructive sleep apnea possibility is highly recommended.    Visit for screening mammogram  Declined in the past  - MA SCREENING DIGITAL BILAT - Future  (s+30); Future    Screening for HIV (human immunodeficiency virus)  Screen for colon cancer  declines      Possible Sleep Apnea: possible        Risks and Recommendations:  The patient has the following additional risks and recommendations for perioperative complications:   - No identified additional risk factors other than previously  addressed    Medication Instructions:   - ACE/ARB: HOLD due to risk of hypotension during surgery.  - ibuprofen (Advil, Motrin): HOLD 1 day before surgery.     RECOMMENDATION:  APPROVAL GIVEN to proceed with proposed procedure, without further diagnostic evaluation.    Subjective     HPI related to upcoming procedure: Bilateral cataracts in need of intervention.    Preop Questions 5/12/2022   1. Have you ever had a heart attack or stroke? No   2. Have you ever had surgery on your heart or blood vessels, such as a stent placement, a coronary artery bypass, or surgery on an artery in your head, neck, heart, or legs? No   3. Do you have chest pain with activity? No   4. Do you have a history of  heart failure? No   5. Do you currently have a cold, bronchitis or symptoms of other infection? No   6. Do you have a cough, shortness of breath, or wheezing? YES -COPD   7. Do you or anyone in your family have previous history of blood clots? No   8. Do you or does anyone in your family have a serious bleeding problem such as prolonged bleeding following surgeries or cuts? No   9. Have you ever had problems with anemia or been told to take iron pills? YES -reported   10. Have you had any abnormal blood loss such as black, tarry or bloody stools, or abnormal vaginal bleeding? No   11. Have you ever had a blood transfusion? No   12. Are you willing to have a blood transfusion if it is medically needed before, during, or after your surgery? Yes   13. Have you or any of your relatives ever had problems with anesthesia? No   14. Do you have sleep apnea, excessive snoring or daytime drowsiness? YES -    14a. Do you have a CPAP machine? No   15. Do you have any artifical heart valves or other implanted medical devices like a pacemaker, defibrillator, or continuous glucose monitor? No   16. Do you have artificial joints? No   17. Are you allergic to latex? No   18. Is there any chance that you may be pregnant? No       Health Care  Directive:  Patient does not have a Health Care Directive or Living Will:     Preoperative Review of :   reviewed - no record of controlled substances prescribed.    Review of Systems  CONSTITUTIONAL: NEGATIVE for fever, chills, change in weight  INTEGUMENTARY/SKIN: NEGATIVE for worrisome rashes, moles or lesions  EYES: NEGATIVE for vision changes or irritation  ENT/MOUTH: NEGATIVE for ear, mouth and throat problems  RESP: NEGATIVE for significant cough or SOB  CV: NEGATIVE for chest pain, palpitations or peripheral edema  GI: NEGATIVE for nausea, abdominal pain, heartburn, or change in bowel habits  : NEGATIVE for frequency, dysuria, or hematuria  MUSCULOSKELETAL: NEGATIVE for significant arthralgias or myalgia  NEURO: NEGATIVE for weakness, dizziness or paresthesias  ENDOCRINE: NEGATIVE for temperature intolerance, skin/hair changes  HEME: NEGATIVE for bleeding problems  PSYCHIATRIC: NEGATIVE for changes in mood or affect    Patient Active Problem List    Diagnosis Date Noted     Prediabetes 10/19/2020     Priority: Medium     Statin declined 05/30/2019     Priority: Medium     Essential hypertension 04/25/2019     Priority: Medium     Obesity (BMI 35.0-39.9) with comorbidity (H) 04/25/2019     Priority: Medium     COPD (chronic obstructive pulmonary disease) (H) 11/13/2014     Priority: Medium     Tobacco abuse 11/17/2011     Priority: Medium     Cough 11/17/2011     Priority: Medium     CARDIOVASCULAR SCREENING; LDL GOAL LESS THAN 130 11/17/2011     Priority: Medium     Tingling in extremities 11/17/2011     Priority: Medium      Past Medical History:   Diagnosis Date     COPD (chronic obstructive pulmonary disease) (H)     Qualifies for oxygen with exertion but refuses prescription     Dysmenorrhea      Tobacco abuse      Uterine cancer (H) 2007    noted on uterus/pathology     Past Surgical History:   Procedure Laterality Date     BACK SURGERY  1999    L4, L5, S1 fusion     BACK SURGERY  1997     herniated disc     GYN SURGERY  05/21/2008    hysterectomy- total and oophrectomy, uterine cancer     HEAD & NECK SURGERY  1969    tonsilectomy     HYSTERECTOMY, PAP NO LONGER INDICATED       Current Outpatient Medications   Medication Sig Dispense Refill     albuterol (VENTOLIN HFA) 108 (90 Base) MCG/ACT inhaler USE 2 PUFFS BY MOUTH INTO THE LUNGS EVERY 4 HOURS AS NEEDED FOR SHORTNESS OF BREATH/ DYSPNEA 18 g 1     clotrimazole (MYCELEX) 10 MG lozenge Place 1 lozenge (10 mg) inside cheek 5 times daily 70 [place] 0     Flaxseed, Linseed, (FLAXSEED OIL) 1000 MG CAPS Take 500 mg by mouth       fluticasone-salmeterol (ADVAIR) 500-50 MCG/DOSE inhaler Inhale 1 puff into the lungs every 12 hours 3 each 3     IBUPROFEN PO Take  by mouth daily as needed.       ipratropium - albuterol 0.5 mg/2.5 mg/3 mL (DUONEB) 0.5-2.5 (3) MG/3ML neb solution USE 3 ML VIA NEBULIZER EVERY 8 HOURS AS NEEDED 90 mL 1     ipratropium-albuterol (COMBIVENT RESPIMAT)  MCG/ACT inhaler Inhale 1 puff into the lungs 2 times daily 3 each 3     lisinopril (ZESTRIL) 40 MG tablet Take 1 tablet (40 mg) by mouth daily 90 tablet 3     order for DME Equipment being ordered: Nebulizer 1 Units 0     tiotropium (SPIRIVA HANDIHALER) 18 MCG inhaled capsule INHALE THE CONTENTS OF 1 CAPSULE VIA INHALATION DEVICE DAILY 90 capsule 0       No Known Allergies     Social History     Tobacco Use     Smoking status: Current Every Day Smoker     Packs/day: 1.00     Years: 39.00     Pack years: 39.00     Types: Cigarettes     Start date: 6/1/1979     Smokeless tobacco: Never Used   Substance Use Topics     Alcohol use: No     Family History   Problem Relation Age of Onset     Diabetes Mother      Hypertension Mother      Cancer Mother      Atrial fibrillation Mother      C.A.D. Father      Diabetes Father      Hypertension Father      Gastrointestinal Disease Father         colon polyps     C.A.D. Maternal Grandmother      Diabetes Maternal Grandmother      Diabetes  Maternal Grandfather      AMADA. Maternal Grandfather      AMADA. Paternal Grandmother      Diabetes Paternal Grandmother      Cancer Paternal Grandmother         lung CA     Diabetes Paternal Grandfather      Alzheimer Disease Maternal Aunt      Coronary Artery Disease No family hx of      Hyperlipidemia No family hx of      Ovarian Cancer No family hx of      Prostate Cancer No family hx of      Depression/Anxiety No family hx of      Thyroid Disease No family hx of      Asthma No family hx of      Known Genetic Syndrome No family hx of      Breast Cancer No family hx of      Cancer - colorectal No family hx of      Cerebrovascular Disease No family hx of      Anesthesia Reaction No family hx of      Osteoporosis No family hx of      Chemical Addiction No family hx of      History   Drug Use No         Objective     There were no vitals taken for this visit.    Physical Exam    GENERAL APPEARANCE: healthy, alert and no distress     HENT: ear canals and TM's normal and nose and mouth without ulcers or lesions     NECK: no adenopathy, no asymmetry, masses, or scars and thyroid normal to palpation     RESP: lungs clear to auscultation - no rales, rhonchi or wheezes     CV: regular rates and rhythm, normal S1 S2, no S3 or S4 and no murmur, click or rub     ABDOMEN:  soft, nontender, no HSM or masses and bowel sounds normal     MS: extremities normal- no gross deformities noted, no evidence of inflammation in joints, FROM in all extremities.     SKIN: no suspicious lesions or rashes     NEURO: Normal strength and tone, sensory exam grossly normal, mentation intact and speech normal     PSYCH: mentation appears normal. and affect normal/bright     LYMPHATICS: No cervical adenopathy    Recent Labs   Lab Test 04/12/21  1421 10/19/20  1511   HGB  --  16.9*   PLT  --  258     --    POTASSIUM 4.1  --    CR 0.69  --    A1C  --  5.9*        Diagnostics:  Labs pending at this time.  Results will be reviewed when  available.   No EKG required for low risk surgery (cataract, skin procedure, breast biopsy, etc).    Revised Cardiac Risk Index (RCRI):  The patient has the following serious cardiovascular risks for perioperative complications:   - No serious cardiac risks = 0 points     RCRI Interpretation: 1 point: Class II (low risk - 0.9% complication rate)           Signed Electronically by: Andry Pearson PA-C  Copy of this evaluation report is provided to requesting physician.

## 2022-06-06 ENCOUNTER — OFFICE VISIT (OUTPATIENT)
Dept: FAMILY MEDICINE | Facility: CLINIC | Age: 62
End: 2022-06-06
Payer: COMMERCIAL

## 2022-06-06 VITALS
SYSTOLIC BLOOD PRESSURE: 150 MMHG | TEMPERATURE: 98 F | DIASTOLIC BLOOD PRESSURE: 60 MMHG | RESPIRATION RATE: 22 BRPM | HEIGHT: 62 IN | BODY MASS INDEX: 36.44 KG/M2 | WEIGHT: 198 LBS | HEART RATE: 90 BPM | OXYGEN SATURATION: 92 %

## 2022-06-06 DIAGNOSIS — R73.03 PREDIABETES: ICD-10-CM

## 2022-06-06 DIAGNOSIS — J44.1 COPD EXACERBATION (H): ICD-10-CM

## 2022-06-06 DIAGNOSIS — J43.2 CENTRILOBULAR EMPHYSEMA (H): Primary | ICD-10-CM

## 2022-06-06 DIAGNOSIS — I10 ESSENTIAL HYPERTENSION: ICD-10-CM

## 2022-06-06 DIAGNOSIS — E11.69 TYPE 2 DIABETES MELLITUS WITH OTHER SPECIFIED COMPLICATION, WITHOUT LONG-TERM CURRENT USE OF INSULIN (H): ICD-10-CM

## 2022-06-06 DIAGNOSIS — J43.9 PULMONARY EMPHYSEMA, UNSPECIFIED EMPHYSEMA TYPE (H): ICD-10-CM

## 2022-06-06 DIAGNOSIS — J44.9 CHRONIC OBSTRUCTIVE PULMONARY DISEASE, UNSPECIFIED COPD TYPE (H): ICD-10-CM

## 2022-06-06 LAB
ANION GAP SERPL CALCULATED.3IONS-SCNC: 4 MMOL/L (ref 3–14)
BUN SERPL-MCNC: 15 MG/DL (ref 7–30)
CALCIUM SERPL-MCNC: 9.4 MG/DL (ref 8.5–10.1)
CHLORIDE BLD-SCNC: 101 MMOL/L (ref 94–109)
CO2 SERPL-SCNC: 32 MMOL/L (ref 20–32)
CREAT SERPL-MCNC: 0.84 MG/DL (ref 0.52–1.04)
GFR SERPL CREATININE-BSD FRML MDRD: 79 ML/MIN/1.73M2
GLUCOSE BLD-MCNC: 157 MG/DL (ref 70–99)
HBA1C MFR BLD: 6.6 % (ref 0–5.6)
POTASSIUM BLD-SCNC: 4.2 MMOL/L (ref 3.4–5.3)
SODIUM SERPL-SCNC: 137 MMOL/L (ref 133–144)

## 2022-06-06 PROCEDURE — 84443 ASSAY THYROID STIM HORMONE: CPT | Performed by: NURSE PRACTITIONER

## 2022-06-06 PROCEDURE — 99214 OFFICE O/P EST MOD 30 MIN: CPT | Performed by: NURSE PRACTITIONER

## 2022-06-06 PROCEDURE — 82248 BILIRUBIN DIRECT: CPT | Performed by: NURSE PRACTITIONER

## 2022-06-06 PROCEDURE — 80053 COMPREHEN METABOLIC PANEL: CPT | Performed by: NURSE PRACTITIONER

## 2022-06-06 PROCEDURE — 83036 HEMOGLOBIN GLYCOSYLATED A1C: CPT | Performed by: NURSE PRACTITIONER

## 2022-06-06 PROCEDURE — 36415 COLL VENOUS BLD VENIPUNCTURE: CPT | Performed by: NURSE PRACTITIONER

## 2022-06-06 RX ORDER — TIOTROPIUM BROMIDE 18 UG/1
CAPSULE ORAL; RESPIRATORY (INHALATION)
Qty: 90 CAPSULE | Refills: 0 | Status: SHIPPED | OUTPATIENT
Start: 2022-06-06 | End: 2022-09-30

## 2022-06-06 RX ORDER — LISINOPRIL 40 MG/1
40 TABLET ORAL DAILY
Qty: 90 TABLET | Refills: 3 | Status: SHIPPED | OUTPATIENT
Start: 2022-06-06 | End: 2023-01-26 | Stop reason: SINTOL

## 2022-06-06 RX ORDER — FLUTICASONE PROPIONATE 110 UG/1
1 AEROSOL, METERED RESPIRATORY (INHALATION) 2 TIMES DAILY
Qty: 36 G | Refills: 3 | Status: SHIPPED | OUTPATIENT
Start: 2022-06-06 | End: 2023-02-27 | Stop reason: ALTCHOICE

## 2022-06-06 RX ORDER — IPRATROPIUM BROMIDE AND ALBUTEROL SULFATE 2.5; .5 MG/3ML; MG/3ML
SOLUTION RESPIRATORY (INHALATION)
Qty: 90 ML | Refills: 1 | Status: SHIPPED | OUTPATIENT
Start: 2022-06-06 | End: 2023-12-01

## 2022-06-06 RX ORDER — ALBUTEROL SULFATE 90 UG/1
AEROSOL, METERED RESPIRATORY (INHALATION)
Qty: 18 G | Refills: 1 | Status: SHIPPED | OUTPATIENT
Start: 2022-06-06 | End: 2023-04-21

## 2022-06-06 ASSESSMENT — ENCOUNTER SYMPTOMS
MYALGIAS: 1
BREAST MASS: 0
SHORTNESS OF BREATH: 1
HEARTBURN: 0
DIARRHEA: 0
FEVER: 0
ABDOMINAL PAIN: 0
SORE THROAT: 0
JOINT SWELLING: 1
HEMATURIA: 0
FREQUENCY: 1
CHILLS: 0
CONSTIPATION: 0
HEMATOCHEZIA: 0
NERVOUS/ANXIOUS: 0
NAUSEA: 0
PARESTHESIAS: 0
HEADACHES: 0
PALPITATIONS: 0
DYSURIA: 0
DIZZINESS: 0
EYE PAIN: 0
ARTHRALGIAS: 0
COUGH: 0
WEAKNESS: 1

## 2022-06-06 ASSESSMENT — PAIN SCALES - GENERAL: PAINLEVEL: NO PAIN (0)

## 2022-06-07 ENCOUNTER — TELEPHONE (OUTPATIENT)
Dept: FAMILY MEDICINE | Facility: CLINIC | Age: 62
End: 2022-06-07
Payer: COMMERCIAL

## 2022-06-07 DIAGNOSIS — E11.69 TYPE 2 DIABETES MELLITUS WITH OTHER SPECIFIED COMPLICATION, WITHOUT LONG-TERM CURRENT USE OF INSULIN (H): Primary | ICD-10-CM

## 2022-06-07 LAB
ALBUMIN SERPL-MCNC: 4.1 G/DL (ref 3.4–5)
ALP SERPL-CCNC: 91 U/L (ref 40–150)
ALT SERPL W P-5'-P-CCNC: 32 U/L (ref 0–50)
AST SERPL W P-5'-P-CCNC: 22 U/L (ref 0–45)
BILIRUB DIRECT SERPL-MCNC: <0.1 MG/DL (ref 0–0.2)
BILIRUB SERPL-MCNC: 0.4 MG/DL (ref 0.2–1.3)
PROT SERPL-MCNC: 7.1 G/DL (ref 6.8–8.8)
TSH SERPL DL<=0.005 MIU/L-ACNC: 1.57 MU/L (ref 0.4–4)

## 2022-06-07 NOTE — TELEPHONE ENCOUNTER
RN called and spoke to patient, gave her providers message and recommendations.     Lab only appointment scheduled for June 21st at 2:30pm after her nurse BP check. Patient informed she needs to be fasting for the appointment. Offered AM appointments but she declined. (Please place orders)     Patient garcia Not want to start mediations at this time. RN encouraged her to reconsider and think about it as it would be beneficial to her health. Follow up telephone visit scheduled for June 23rd with Susan Varma.     Patient would like to proceed with diabetic education. Please place orders. RN informed patient the diabetic education team would reach out to her to schedule an initial visit.     Patient had no other questions at this time.     Xiomara Argueta RN on 6/7/2022 at 9:52 AM

## 2022-06-07 NOTE — TELEPHONE ENCOUNTER
"Call pt.   Has high cost insurance.   New diagnosis of DM- type II. I would like to add more medications/labs- LFT\"s, lipids, and start her on with DM education.   Recommend statin, low dose ASA- 81 mg, and starting Metformin  mg daily.   Please advise, and answer initial questions. Advise follow-up with me- 1 month or sooner with virtual.   Not needing BG testing with this initial start, but needs teaching and to start this with DM.     Susan Varma, APRN CNP   "

## 2022-06-08 ENCOUNTER — TELEPHONE (OUTPATIENT)
Dept: FAMILY MEDICINE | Facility: CLINIC | Age: 62
End: 2022-06-08
Payer: COMMERCIAL

## 2022-06-08 NOTE — TELEPHONE ENCOUNTER
Diabetes Education Scheduling Outreach #1:    Call to patient to schedule. Left message with phone number to call to schedule.    Plan for 2nd outreach attempt within 1 week.    Anitra Mejia  [facility]  Diabetes and Nutrition Scheduling

## 2022-06-09 DIAGNOSIS — J44.9 CHRONIC OBSTRUCTIVE PULMONARY DISEASE, UNSPECIFIED COPD TYPE (H): ICD-10-CM

## 2022-06-10 RX ORDER — FLUTICASONE PROPIONATE AND SALMETEROL 500; 50 UG/1; UG/1
POWDER RESPIRATORY (INHALATION)
Qty: 3 EACH | Refills: 1 | Status: SHIPPED | OUTPATIENT
Start: 2022-06-10 | End: 2023-02-27

## 2022-06-10 NOTE — TELEPHONE ENCOUNTER
"Pending Prescriptions:                       Disp   Refills    WIXELA INHUB 500-50 MCG/ACT inhaler [Pharm*                Sig: INHALE 1 PUFF INTO THE LUNGS EVERY 12 HOURS    Routing refill request to provider for review/approval b    Requested Prescriptions   Pending Prescriptions Disp Refills    WIXELA INHUB 500-50 MCG/ACT inhaler [Pharmacy Med Name: WIXELA INHUB DISKUS 500/50MCG 60S]       Sig: INHALE 1 PUFF INTO THE LUNGS EVERY 12 HOURS        Inhaled Steroids Protocol Failed - 6/9/2022 12:29 PM        Failed - Medication is active on med list        Passed - Patient is age 12 or older        Passed - Recent (12 mo) or future (30 days) visit within the authorizing provider's specialty     Patient has had an office visit with the authorizing provider or a provider within the authorizing providers department within the previous 12 mos or has a future within next 30 days. See \"Patient Info\" tab in inbasket, or \"Choose Columns\" in Meds & Orders section of the refill encounter.             Long-Acting Beta Agonist Inhalers Protocol  Failed - 6/9/2022 12:29 PM        Failed - Order for Serevent, Striverdi, or Foradil and pt has steroid inhaler        Failed - Medication is active on med list        Passed - Patient is age 12 or older        Passed - Recent (12 mo) or future (30 days) visit within the authorizing provider's specialty     Patient has had an office visit with the authorizing provider or a provider within the authorizing providers department within the previous 12 mos or has a future within next 30 days. See \"Patient Info\" tab in inbasket, or \"Choose Columns\" in Meds & Orders section of the refill encounter.                        "

## 2022-06-14 DIAGNOSIS — Z13.220 LIPID SCREENING: Primary | ICD-10-CM

## 2022-06-14 DIAGNOSIS — E11.69 TYPE 2 DIABETES MELLITUS WITH OTHER SPECIFIED COMPLICATION, WITHOUT LONG-TERM CURRENT USE OF INSULIN (H): ICD-10-CM

## 2022-06-14 NOTE — PROGRESS NOTES
Patient coming in 6/21 for labs.  Lab order placed is for appoximately 09/07/22 did you want the patient to wait until September?  If not new orders would need to be placed closer to the date the patient is coming in.    Thank you

## 2022-06-21 ENCOUNTER — LAB (OUTPATIENT)
Dept: LAB | Facility: CLINIC | Age: 62
End: 2022-06-21

## 2022-06-21 ENCOUNTER — ALLIED HEALTH/NURSE VISIT (OUTPATIENT)
Dept: FAMILY MEDICINE | Facility: CLINIC | Age: 62
End: 2022-06-21
Payer: COMMERCIAL

## 2022-06-21 VITALS — DIASTOLIC BLOOD PRESSURE: 68 MMHG | SYSTOLIC BLOOD PRESSURE: 136 MMHG

## 2022-06-21 DIAGNOSIS — Z13.220 LIPID SCREENING: ICD-10-CM

## 2022-06-21 DIAGNOSIS — Z01.30 BP CHECK: Primary | ICD-10-CM

## 2022-06-21 DIAGNOSIS — E11.69 TYPE 2 DIABETES MELLITUS WITH OTHER SPECIFIED COMPLICATION, WITHOUT LONG-TERM CURRENT USE OF INSULIN (H): ICD-10-CM

## 2022-06-21 PROCEDURE — 82043 UR ALBUMIN QUANTITATIVE: CPT

## 2022-06-21 PROCEDURE — 99207 PR NO CHARGE NURSE ONLY: CPT

## 2022-06-21 PROCEDURE — 36415 COLL VENOUS BLD VENIPUNCTURE: CPT

## 2022-06-21 PROCEDURE — 80061 LIPID PANEL: CPT

## 2022-06-21 NOTE — PROGRESS NOTES
Yarely Dia is a 61 year old patient who comes in today for a Blood Pressure check.  Initial BP:  /68      Data Unavailable  Disposition: follow-up as previously indicated by provider and results routed to provider

## 2022-06-21 NOTE — NURSING NOTE
Yarely Dia is a 61 year old patient who comes in today for a Blood Pressure check.  Initial BP:  /68      Data Unavailable  Disposition: follow-up as previously indicated by provider and results routed to provider    Patient states the new blood pressure medication, diltiazem, is making her leg from knee to ankle swell and arm swell and is making her dizzy.  Patient states she takes this at night before bed. Patient declined to speak to RN, would like to wait to hear from provider. Patient states she stopped taking medication over the weekend, but started taking it again Sunday night.  Patient understands provider is not in clinic today and will wait to hear back.

## 2022-06-22 LAB
CHOLEST SERPL-MCNC: 171 MG/DL
CREAT UR-MCNC: 144 MG/DL
FASTING STATUS PATIENT QL REPORTED: YES
HDLC SERPL-MCNC: 48 MG/DL
LDLC SERPL CALC-MCNC: 99 MG/DL
MICROALBUMIN UR-MCNC: 9 MG/L
MICROALBUMIN/CREAT UR: 6.25 MG/G CR (ref 0–25)
NONHDLC SERPL-MCNC: 123 MG/DL
TRIGL SERPL-MCNC: 119 MG/DL

## 2022-06-23 ENCOUNTER — VIRTUAL VISIT (OUTPATIENT)
Dept: FAMILY MEDICINE | Facility: CLINIC | Age: 62
End: 2022-06-23
Payer: COMMERCIAL

## 2022-06-23 DIAGNOSIS — E11.69 TYPE 2 DIABETES MELLITUS WITH OTHER SPECIFIED COMPLICATION, WITHOUT LONG-TERM CURRENT USE OF INSULIN (H): Primary | ICD-10-CM

## 2022-06-23 DIAGNOSIS — I10 PRIMARY HYPERTENSION: ICD-10-CM

## 2022-06-23 DIAGNOSIS — L30.9 DERMATITIS: ICD-10-CM

## 2022-06-23 PROBLEM — E11.9 DIABETES MELLITUS, TYPE 2 (H): Status: ACTIVE | Noted: 2022-06-23

## 2022-06-23 PROCEDURE — 99214 OFFICE O/P EST MOD 30 MIN: CPT | Mod: TEL | Performed by: NURSE PRACTITIONER

## 2022-06-23 RX ORDER — METOPROLOL SUCCINATE 50 MG/1
50 TABLET, EXTENDED RELEASE ORAL DAILY
Qty: 30 TABLET | Refills: 1 | Status: SHIPPED | OUTPATIENT
Start: 2022-06-23 | End: 2022-09-23

## 2022-06-23 RX ORDER — TRIAMCINOLONE ACETONIDE 1 MG/G
OINTMENT TOPICAL 2 TIMES DAILY
Qty: 30 G | Refills: 0 | Status: SHIPPED | OUTPATIENT
Start: 2022-06-23 | End: 2023-02-27

## 2022-06-23 RX ORDER — DILTIAZEM HYDROCHLORIDE 180 MG/1
CAPSULE, EXTENDED RELEASE ORAL
COMMUNITY
Start: 2022-06-06 | End: 2022-06-23 | Stop reason: SINTOL

## 2022-06-23 NOTE — PROGRESS NOTES
Yarely is a 61 year old who is being evaluated via a billable telephone visit.      What phone number would you like to be contacted at? [phone]  How would you like to obtain your AVS? Mail a copy    Assessment & Plan       ICD-10-CM    1. Type 2 diabetes mellitus with other specified complication, without long-term current use of insulin (H)  E11.69 AMB Adult Diabetes Educator Referral     STATIN NOT PRESCRIBED (INTENTIONAL)     CANCELED: AMB Adult Diabetes Educator Referral   2. Primary hypertension  I10 metoprolol succinate ER (TOPROL XL) 50 MG 24 hr tablet   3. Dermatitis  L30.9 triamcinolone (KENALOG) 0.1 % external ointment         Pt. Not open to statin, or Metformin.  Discussed healthy changes. Discussed stroke/CV risk, pt. Still declines medications. She will consider Truliticy.  DM ed- pending appt.   Eye exam recommended. - Had lasik.     Labs reviewed.   Re-check with me in 2-3 months.          FUTURE APPOINTMENTS:       - diabetic ed- scheduled.   Trial of Trulicity for Dm and weight loss.   Work on weight loss    -HTN- starting Toprol, pt. Will take 1/2 tab for one week - 25 mg and taper up to 50 mg every day. Pt. To call if not keeping home BP readings under 140/90.    Trial of triamcinolone to rash on elbows, suspect atopic derm. Vs. Other.     REBECA for eye exam.     Return in about 3 days (around 6/26/2022) for Specialty follow-up/Referral.    DEANNE Chen [facility] NATHALIE Pritchett is a 61 year old, presenting for the following health issues:  Diabetes (Newly diagnosed Follow up)      HPI     Diabetes Follow-up      How often are you checking your blood sugar? Not at all    What concerns do you have today about your diabetes? None     Do you have any of these symptoms? (Select all that apply)  Numbness in feet and Weight gain    Have you had a diabetic eye exam in the last 12 months? No    Numbness in feet- attributes to sciatica, as unilateral.   Pt. Does  not want a Statin or Metformin for her diabetes due to research she has read.   She wants to try diet control.  She is open to daily 81 mg ASA.     HTN- Leg swelling on Cardizem, no able to tolerate this. BP running 150's with home monitoring.     Pt. Forgot to ask about elbows in clinic, dry,cracking, has been a chronic issue. NO itching.   Creams/moisturizers/lotions- all not helping.           BP Readings from Last 2 Encounters:   06/21/22 136/68   06/06/22 (!) 150/60     Hemoglobin A1C POCT (%)   Date Value   10/19/2020 5.9 (H)     Hemoglobin A1C (%)   Date Value   06/06/2022 6.6 (H)     LDL Cholesterol Calculated (mg/dL)   Date Value   06/21/2022 99   10/19/2020 112 (H)   05/28/2019 131 (H)           How many servings of fruits and vegetables do you eat daily?  2-3    On average, how many sweetened beverages do you drink each day (Examples: soda, juice, sweet tea, etc.  Do NOT count diet or artificially sweetened beverages)?   0    How many days per week do you exercise enough to make your heart beat faster? 3 or less    How many minutes a day do you exercise enough to make your heart beat faster? 9 or less    How many days per week do you miss taking your medication? 0        Review of Systems   Constitutional, HEENT, cardiovascular, pulmonary, gi and gu systems are negative, except as otherwise noted.      Objective           Vitals:  No vitals were obtained today due to virtual visit.    Physical Exam   healthy, alert and no distress  PSYCH: Alert and oriented times 3; coherent speech, normal   rate and volume, able to articulate logical thoughts, able   to abstract reason, no tangential thoughts, no hallucinations   or delusions  Her affect is normal and pleasant  RESP: No cough, no audible wheezing, able to talk in full sentences  Remainder of exam unable to be completed due to telephone visits                Phone call duration: 15 minutes    .  ..

## 2022-06-27 ENCOUNTER — VIRTUAL VISIT (OUTPATIENT)
Dept: EDUCATION SERVICES | Facility: OTHER | Age: 62
End: 2022-06-27
Payer: COMMERCIAL

## 2022-06-27 DIAGNOSIS — E11.69 TYPE 2 DIABETES MELLITUS WITH OTHER SPECIFIED COMPLICATION, WITHOUT LONG-TERM CURRENT USE OF INSULIN (H): ICD-10-CM

## 2022-06-27 PROCEDURE — G0108 DIAB MANAGE TRN  PER INDIV: HCPCS | Mod: 95 | Performed by: DIETITIAN, REGISTERED

## 2022-06-27 RX ORDER — LANCETS
EACH MISCELLANEOUS
Qty: 100 EACH | Refills: 6 | Status: SHIPPED | OUTPATIENT
Start: 2022-06-27 | End: 2024-05-02

## 2022-06-27 NOTE — PROGRESS NOTES
"Diabetes Self-Management Education & Support    Type of Service: Telephone Visit    Originating Location (Patient Location): Home  Distant Location (Provider Location): [facility]  Mode of Communication:  Telephone        Presents for: Initial Assessment for new diagnosis    SUBJECTIVE/OBJECTIVE:  Presents for: Initial Assessment for new diagnosis  Accompanied by: Self  Diabetes education in the past 24mo: No  Focus of Visit: Monitoring, Healthy Eating  Diabetes type: Type 2  Date of diagnosis: 2022  How confident are you filling out medical forms by yourself:: Not Assessed  Diabetes management related comments/concerns: Do I have to check my blood sugars daily forever?  Other concerns:: None  Cultural Influences/Ethnic Background:  Not  or       Diabetes Symptoms & Complications:  Weight trend: Increasing (Says has been gaining 10# per year - 5-6 years)  Complications assessed today?: No    Patient Problem List and Family Medical History reviewed for relevant medical history, current medical status, and diabetes risk factors.    Vitals:  There were no vitals taken for this visit.  Estimated body mass index is 36.81 kg/m  as calculated from the following:    Height as of 6/6/22: 1.562 m (5' 1.5\").    Weight as of 6/6/22: 89.8 kg (198 lb).   Last 3 BP:   BP Readings from Last 3 Encounters:   06/21/22 136/68   06/06/22 (!) 150/60   05/12/22 138/74       History   Smoking Status     Current Every Day Smoker     Packs/day: 1.00     Years: 39.00     Types: Cigarettes     Start date: 6/1/1979   Smokeless Tobacco     Never Used       Labs:  Lab Results   Component Value Date    A1C 6.6 06/06/2022    A1C 5.9 10/19/2020     Lab Results   Component Value Date     06/06/2022    GLC 83 04/12/2021     Lab Results   Component Value Date    LDL 99 06/21/2022     10/19/2020     HDL Cholesterol   Date Value Ref Range Status   10/19/2020 36 (L) >49 mg/dL Final     Direct Measure HDL " "  Date Value Ref Range Status   06/21/2022 48 (L) >=50 mg/dL Final   ]  GFR Estimate   Date Value Ref Range Status   06/06/2022 79 >60 mL/min/1.73m2 Final     Comment:     Effective December 21, 2021 eGFRcr in adults is calculated using the 2021 CKD-EPI creatinine equation which includes age and gender (Jasvir et al., NE, DOI: 10.1056/LPSUwf6484013)   04/12/2021 >90 >60 mL/min/[1.73_m2] Final     Comment:     Non  GFR Calc  Starting 12/18/2018, serum creatinine based estimated GFR (eGFR) will be   calculated using the Chronic Kidney Disease Epidemiology Collaboration   (CKD-EPI) equation.       GFR Estimate If Black   Date Value Ref Range Status   04/12/2021 >90 >60 mL/min/[1.73_m2] Final     Comment:      GFR Calc  Starting 12/18/2018, serum creatinine based estimated GFR (eGFR) will be   calculated using the Chronic Kidney Disease Epidemiology Collaboration   (CKD-EPI) equation.       Lab Results   Component Value Date    CR 0.84 06/06/2022    CR 0.69 04/12/2021     No results found for: MICROALBUMIN    Healthy Eating:  Healthy Eating Assessed Today: Yes  Meal planning/habits: Smaller portions  Meals include: Breakfast, Lunch, Dinner  Breakfast: 8193-3571 oatmeal, omelet (2 eggs), sometimes leftover supper.  PB bread (1-2 of these with PB and butter) and coffee (black)  Lunch: 8036-4268 hot dog, pizza, tuna sandwich, rice  Dinner: 6223-1422 rice with chicken.  \"We have rice with everything.\"  Rice usually just butter, somes will put Yum yum sauce on it  Other: Says does not sleep well  due to COPD  Beverages: Coffee, Water, Tea, Soda, Alcohol (Might have regular pop on the weekends (only); 1-2 times per year)  Has patient met with a dietitian in the past?: No    Being Active:  Being Active Assessed Today: Yes  Exercise:: Currently not exercising (Says has a lot of yard work to do, lives on 1 acre.  Cleans house)  Barrier to exercise: Physical limitation " (COPD)    Monitoring:  Monitoring Assessed Today: No  Did patient bring glucose meter to appointment? : No (Ordered today)        Taking Medications:      Taking Medication Assessed Today: No  Current Treatments: Diet    Problem Solving:  Is the patient at risk for hypoglycemia?: No  Is the patient at risk for DKA?: No              Reducing Risks:  Reducing Risks Assessed Today: No    Healthy Coping:  Healthy Coping Assessed Today: Yes  Emotional response to diabetes: Ready to learn, Acceptance  Stage of change: ACTION (Actively working towards change)  Patient Activation Measure Survey Score:  DEBRA Score (Last Two) 11/18/2011   DEBRA Raw Score 36   Activation Score 47.4   DEBRA Level 2       Diabetes knowledge and skills assessment:   Patient is knowledgeable in diabetes management concepts related to: Healthy Eating    Patient needs further education on the following diabetes management concepts: Healthy Eating and Monitoring    Based on learning assessment above, most appropriate setting for further diabetes education would be: Individual setting.      INTERVENTIONS:    Education provided today on:  AADE Self-Care Behaviors:  Diabetes Pathophysiology  Healthy Eating: consistency in amount, composition, and timing of food intake and plate planning method  Monitoring: log and interpret results, individual blood glucose targets, frequency of monitoring and proper sharps disposal    Opportunities for ongoing education and support in diabetes-self management were discussed.    Pt verbalized understanding of concepts discussed and recommendations provided today.       Education Materials Provided:  [employer] Understanding Diabetes Booklet, Safe Disposal Options for Needles & Syringes, BG Log Sheet and My Plate Planner      ASSESSMENT:  Pt is newly dx with DM.  Controlling with diet.  Started new dx education including testing blood sugar and plate method to meal planning.  Follow up in a little over a month.      Goals Addressed as of 6/27/2022 at 1:37 PM        Monitoring (pt-stated)     Added 6/27/22 by Angie Mills RD      Check blood sugar 1x per day.  Vary testing times.              Patient's most recent   Lab Results   Component Value Date    A1C 6.6 06/06/2022    A1C 5.9 10/19/2020    is meeting goal of <7.0    PLAN  See Patient Instructions for co-developed, patient-stated behavior change goals.  AVS printed and provided to patient today. See Follow-Up section for recommended follow-up.      Time Spent: 30 minutes  Encounter Type: Individual    Any diabetes medication dose changes were made via the CDE Protocol and Collaborative Practice Agreement with the patient's referring provider. A copy of this encounter was shared with the provider.    Angie Mills RD [facility]  [phone]

## 2022-06-27 NOTE — PATIENT INSTRUCTIONS
1).  Test blood sugar 1x per day.  Vary testing time.  When you wake up, before another meal, 2 hours after start of a meal.  Goal is  fasting and before other meals; less than 180 (2) hours after start of a meal.

## 2022-06-27 NOTE — LETTER
"    6/27/2022         RE: Yarely Dia  [address] [place] [facility] [place] [address]        Dear Colleague,    Thank you for referring your patient, Yarely Dia, to the [facility]. Please see a copy of my visit note below.    Diabetes Self-Management Education & Support    Type of Service: Telephone Visit    Originating Location (Patient Location): Home  Distant Location (Provider Location): [facility]  Mode of Communication:  Telephone        Presents for: Initial Assessment for new diagnosis    SUBJECTIVE/OBJECTIVE:  Presents for: Initial Assessment for new diagnosis  Accompanied by: Self  Diabetes education in the past 24mo: No  Focus of Visit: Monitoring, Healthy Eating  Diabetes type: Type 2  Date of diagnosis: 2022  How confident are you filling out medical forms by yourself:: Not Assessed  Diabetes management related comments/concerns: Do I have to check my blood sugars daily forever?  Other concerns:: None  Cultural Influences/Ethnic Background:  Not  or       Diabetes Symptoms & Complications:  Weight trend: Increasing (Says has been gaining 10# per year - 5-6 years)  Complications assessed today?: No    Patient Problem List and Family Medical History reviewed for relevant medical history, current medical status, and diabetes risk factors.    Vitals:  There were no vitals taken for this visit.  Estimated body mass index is 36.81 kg/m  as calculated from the following:    Height as of 6/6/22: 1.562 m (5' 1.5\").    Weight as of 6/6/22: 89.8 kg (198 lb).   Last 3 BP:   BP Readings from Last 3 Encounters:   06/21/22 136/68   06/06/22 (!) 150/60   05/12/22 138/74       History   Smoking Status     Current Every Day Smoker     Packs/day: 1.00     Years: 39.00     Types: Cigarettes     Start date: 6/1/1979   Smokeless Tobacco     Never Used       Labs:  Lab Results   Component Value Date    A1C 6.6 06/06/2022    A1C 5.9 10/19/2020     Lab Results   Component " "Value Date     06/06/2022    GLC 83 04/12/2021     Lab Results   Component Value Date    LDL 99 06/21/2022     10/19/2020     HDL Cholesterol   Date Value Ref Range Status   10/19/2020 36 (L) >49 mg/dL Final     Direct Measure HDL   Date Value Ref Range Status   06/21/2022 48 (L) >=50 mg/dL Final   ]  GFR Estimate   Date Value Ref Range Status   06/06/2022 79 >60 mL/min/1.73m2 Final     Comment:     Effective December 21, 2021 eGFRcr in adults is calculated using the 2021 CKD-EPI creatinine equation which includes age and gender (Jasvir et al., NE, DOI: 10.1056/HSZDkv7573297)   04/12/2021 >90 >60 mL/min/[1.73_m2] Final     Comment:     Non  GFR Calc  Starting 12/18/2018, serum creatinine based estimated GFR (eGFR) will be   calculated using the Chronic Kidney Disease Epidemiology Collaboration   (CKD-EPI) equation.       GFR Estimate If Black   Date Value Ref Range Status   04/12/2021 >90 >60 mL/min/[1.73_m2] Final     Comment:      GFR Calc  Starting 12/18/2018, serum creatinine based estimated GFR (eGFR) will be   calculated using the Chronic Kidney Disease Epidemiology Collaboration   (CKD-EPI) equation.       Lab Results   Component Value Date    CR 0.84 06/06/2022    CR 0.69 04/12/2021     No results found for: MICROALBUMIN    Healthy Eating:  Healthy Eating Assessed Today: Yes  Meal planning/habits: Smaller portions  Meals include: Breakfast, Lunch, Dinner  Breakfast: 7328-5683 oatmeal, omelet (2 eggs), sometimes leftover supper.  PB bread (1-2 of these with PB and butter) and coffee (black)  Lunch: 6362-3716 hot dog, pizza, tuna sandwich, rice  Dinner: 5227-6102 rice with chicken.  \"We have rice with everything.\"  Rice usually just butter, somes will put Yum yum sauce on it  Other: Says does not sleep well  due to COPD  Beverages: Coffee, Water, Tea, Soda, Alcohol (Might have regular pop on the weekends (only); 1-2 times per year)  Has patient met with a " dietitian in the past?: No    Being Active:  Being Active Assessed Today: Yes  Exercise:: Currently not exercising (Says has a lot of yard work to do, lives on 1 acre.  Cleans house)  Barrier to exercise: Physical limitation (COPD)    Monitoring:  Monitoring Assessed Today: No  Did patient bring glucose meter to appointment? : No (Ordered today)        Taking Medications:      Taking Medication Assessed Today: No  Current Treatments: Diet    Problem Solving:  Is the patient at risk for hypoglycemia?: No  Is the patient at risk for DKA?: No              Reducing Risks:  Reducing Risks Assessed Today: No    Healthy Coping:  Healthy Coping Assessed Today: Yes  Emotional response to diabetes: Ready to learn, Acceptance  Stage of change: ACTION (Actively working towards change)  Patient Activation Measure Survey Score:  DEBRA Score (Last Two) 11/18/2011   DEBRA Raw Score 36   Activation Score 47.4   DEBRA Level 2       Diabetes knowledge and skills assessment:   Patient is knowledgeable in diabetes management concepts related to: Healthy Eating    Patient needs further education on the following diabetes management concepts: Healthy Eating and Monitoring    Based on learning assessment above, most appropriate setting for further diabetes education would be: Individual setting.      INTERVENTIONS:    Education provided today on:  AADE Self-Care Behaviors:  Diabetes Pathophysiology  Healthy Eating: consistency in amount, composition, and timing of food intake and plate planning method  Monitoring: log and interpret results, individual blood glucose targets, frequency of monitoring and proper sharps disposal    Opportunities for ongoing education and support in diabetes-self management were discussed.    Pt verbalized understanding of concepts discussed and recommendations provided today.       Education Materials Provided:  [facility] Understanding Diabetes Booklet, Safe Disposal Options for Needles & Syringes, BG Log  Sheet and My Plate Planner      ASSESSMENT:  Pt is newly dx with DM.  Controlling with diet.  Started new dx education including testing blood sugar and plate method to meal planning.  Follow up in a little over a month.     Goals Addressed as of 6/27/2022 at 1:37 PM        Monitoring (pt-stated)     Added 6/27/22 by Angie Mills RD      Check blood sugar 1x per day.  Vary testing times.              Patient's most recent   Lab Results   Component Value Date    A1C 6.6 06/06/2022    A1C 5.9 10/19/2020    is meeting goal of <7.0    PLAN  See Patient Instructions for co-developed, patient-stated behavior change goals.  AVS printed and provided to patient today. See Follow-Up section for recommended follow-up.      Time Spent: 30 minutes  Encounter Type: Individual    Any diabetes medication dose changes were made via the CDE Protocol and Collaborative Practice Agreement with the patient's referring provider. A copy of this encounter was shared with the provider.    Angie Mills RD [facility]  [phone]

## 2022-07-14 DIAGNOSIS — I10 ESSENTIAL HYPERTENSION: ICD-10-CM

## 2022-07-18 RX ORDER — LISINOPRIL 40 MG/1
TABLET ORAL
Qty: 90 TABLET | Refills: 3 | OUTPATIENT
Start: 2022-07-18

## 2022-07-18 NOTE — TELEPHONE ENCOUNTER
Was sent on 6/6/22 with refills, should not be out at this time, please check profile for any held scripts.

## 2022-09-19 DIAGNOSIS — I10 PRIMARY HYPERTENSION: ICD-10-CM

## 2022-09-19 NOTE — LETTER
September 26, 2022      Yarely Dia  [address] [place] [address]              Dear Yarely,    We have refilled your Metoprolol but wanted to remind you that you are due for a diabetic recheck in person this December.     Please call to schedule.     Your [facility]

## 2022-09-23 RX ORDER — METOPROLOL SUCCINATE 50 MG/1
TABLET, EXTENDED RELEASE ORAL
Qty: 30 TABLET | Refills: 2 | Status: SHIPPED | OUTPATIENT
Start: 2022-09-23 | End: 2023-01-27

## 2022-09-23 NOTE — TELEPHONE ENCOUNTER
Pending Prescriptions:                       Disp   Refills    metoprolol succinate ER (TOPROL XL) 50 MG*30 tab*2            Sig: TAKE 1 TABLET(50 MG) BY MOUTH DAILY    Medication is being filled for 1 time francis refill only due to:  Patient is due for diabetes follow-up in dec in person    Please call and help schedule.  Thank you!

## 2022-09-27 DIAGNOSIS — J43.2 CENTRILOBULAR EMPHYSEMA (H): ICD-10-CM

## 2022-09-30 RX ORDER — TIOTROPIUM BROMIDE 18 UG/1
CAPSULE ORAL; RESPIRATORY (INHALATION)
Qty: 90 CAPSULE | Refills: 0 | Status: SHIPPED | OUTPATIENT
Start: 2022-09-30 | End: 2023-01-27

## 2022-09-30 NOTE — TELEPHONE ENCOUNTER
Prescription approved per [facility] Refill Protocol.  ROBERTO HillN, RN, PHN  [place]  September 30, 2022

## 2022-11-01 ENCOUNTER — TRANSFERRED RECORDS (OUTPATIENT)
Dept: HEALTH INFORMATION MANAGEMENT | Facility: CLINIC | Age: 62
End: 2022-11-01

## 2022-12-20 ENCOUNTER — TELEPHONE (OUTPATIENT)
Dept: FAMILY MEDICINE | Facility: CLINIC | Age: 62
End: 2022-12-20

## 2022-12-20 NOTE — TELEPHONE ENCOUNTER
Patient Quality Outreach    Patient is due for the following:   Diabetes -  A1C, Eye Exam, Statin, Diabetic Follow-Up Visit and Foot Exam  Colon Cancer Screening  Breast Cancer Screening - Mammogram  Physical Preventive Adult Physical      Topic Date Due     Zoster (Shingles) Vaccine (1 of 2) Never done     COVID-19 Vaccine (3 - Booster for Pfizer series) 06/23/2021     Flu Vaccine (1) 09/01/2022       Next Steps:   Schedule a office visit for diabetic recheck and an Adult Preventative    Type of outreach:    Call to schedule annual physical and diabetic recheck-can be either or both reasons-whatever patient is willing to do      Questions for provider review:    Statin-route to Susan Varma CNP to review after last out reach. Patient fails on diabetic list due to reason being patient refusal-is this still must up to date reason?      Alejandrina Underwood, [facility]  Chart routed to Care Team.

## 2022-12-28 NOTE — TELEPHONE ENCOUNTER
"Patient Quality Outreach    Patient is due for the following:   Diabetes -  A1C, Eye Exam, Statin, Diabetic Follow-Up Visit and Foot Exam  Colon Cancer Screening  Breast Cancer Screening - Mammogram  Physical Preventive Adult Physical      Topic Date Due     Zoster (Shingles) Vaccine (1 of 2) Never done     COVID-19 Vaccine (3 - Booster for Pfizer series) 06/23/2021     Flu Vaccine (1) 09/01/2022       Next Steps:   Schedule a office visit for diabetic recheck and an Adult Preventative    Type of outreach:    Spoke with pt and she states that she does not want to do diabetic follow up due to not taking medications at this time as she controls her diabetes with diet and exercise. She also declines doing annual physical at this time too stating \"I don't need any of that\"    Next Steps:  Reach out within 90 days via [employer] App.    Max number of attempts reached: Yes. Will try again in 90 days if patient still on fail list.    Questions for provider review:    none        Alejandrina Underwood, [facility]        "

## 2023-01-10 DIAGNOSIS — J44.9 CHRONIC OBSTRUCTIVE PULMONARY DISEASE, UNSPECIFIED COPD TYPE (H): ICD-10-CM

## 2023-01-10 NOTE — LETTER
January 11, 2023      Yarely Dia  [address] [place] [place] [facility] [address]              Dear Yarely,    We just wanted to let you know that we sent in a refill for your Combivent but you are due for a annual physical visit and med visit before your next refill is due.     Please give us a call at [phone] or use "[employer], Mobile Secretary" to schedule.     Have a great day.    Your [facility] Team

## 2023-01-11 RX ORDER — IPRATROPIUM BROMIDE AND ALBUTEROL 20; 100 UG/1; UG/1
SPRAY, METERED RESPIRATORY (INHALATION)
Qty: 12 G | Refills: 0 | Status: SHIPPED | OUTPATIENT
Start: 2023-01-11 | End: 2023-02-27

## 2023-01-11 NOTE — TELEPHONE ENCOUNTER
Pending Prescriptions:                       Disp   Refills    COMBIVENT RESPIMAT  MCG/ACT inhaler *12 g            Sig: INHALE 1 PUFF BY MOUTH TWICE DAILY    Routing refill request to provider for review/approval because:  Prescription is over 13 months old.    ipratropium-albuterol (COMBIVENT RESPIMAT)  MCG/ACT inhaler 3 each 3 11/4/2021  --   Sig - Route: Inhale 1 puff into the lungs 2 times daily - Inhalation   Sent to pharmacy as: Ipratropium-Albuterol  MCG/ACT Inhalation Aerosol Solution (COMBIVENT RESPIMAT)   Class: E-Prescribe   Order: [id number]   E-Prescribing Status: Receipt confirmed by pharmacy (11/4/2021  1:38 PM CDT)     Nicole Lopez RN on 1/11/2023 at 9:44 AM

## 2023-01-26 ENCOUNTER — VIRTUAL VISIT (OUTPATIENT)
Dept: FAMILY MEDICINE | Facility: CLINIC | Age: 63
End: 2023-01-26
Payer: COMMERCIAL

## 2023-01-26 DIAGNOSIS — E11.69 TYPE 2 DIABETES MELLITUS WITH OTHER SPECIFIED COMPLICATION, WITHOUT LONG-TERM CURRENT USE OF INSULIN (H): Primary | ICD-10-CM

## 2023-01-26 DIAGNOSIS — I10 PRIMARY HYPERTENSION: ICD-10-CM

## 2023-01-26 DIAGNOSIS — L65.9 HAIR LOSS: ICD-10-CM

## 2023-01-26 DIAGNOSIS — J43.2 CENTRILOBULAR EMPHYSEMA (H): ICD-10-CM

## 2023-01-26 DIAGNOSIS — Z53.20 COLON CANCER SCREENING DECLINED: ICD-10-CM

## 2023-01-26 DIAGNOSIS — I10 ESSENTIAL HYPERTENSION: ICD-10-CM

## 2023-01-26 DIAGNOSIS — Z72.0 TOBACCO ABUSE: ICD-10-CM

## 2023-01-26 DIAGNOSIS — Z53.20 BREAST SCREENING DECLINED: ICD-10-CM

## 2023-01-26 PROCEDURE — 99214 OFFICE O/P EST MOD 30 MIN: CPT | Mod: 95 | Performed by: NURSE PRACTITIONER

## 2023-01-26 RX ORDER — BIOTIN 1 MG
10000 TABLET ORAL DAILY
COMMUNITY

## 2023-01-26 RX ORDER — LOSARTAN POTASSIUM 25 MG/1
TABLET ORAL
Qty: 53 TABLET | Refills: 1 | Status: SHIPPED | OUTPATIENT
Start: 2023-01-26 | End: 2023-02-27

## 2023-01-26 NOTE — PROGRESS NOTES
"Yarely is a 62 year old who is being evaluated via a billable telephone visit.      What phone number would you like to be contacted at? [phone]   How would you like to obtain your AVS? Mail a copy    Distant Location (provider location):  Off-site    Assessment & Plan     Type 2 diabetes mellitus with other specified complication, without long-term current use of insulin (H)  likely controlled, needs labs updated.   Pt. Not wanting meds.   Healthy habits encouraged.   - CBC with Platelets & Differential; Future  - Hemoglobin A1c; Future  - Comprehensive metabolic panel; Future    Hair loss  - pt. Thinks due to Lisinopril, both this and Toprol have this a potential SA.   Trial or ARB instead. Increasing taper to goal of 50 mg.   Re-check with labs, does not want derm referral now.   - TSH with free T4 reflex; Future    Tobacco abuse  Cessation as able.     Centrilobular emphysema (H)  Severe, not wanting any changes due to cost.     Essential hypertension  Controlled, updating labs.   switching to ARB due to poss. Hair loss on Lisinopril.   - Comprehensive metabolic panel; Future  - losartan (COZAAR) 25 MG tablet; Take 1 tablet (25 mg) by mouth daily for 7 days, THEN 2 tablets (50 mg) daily for 23 days.    Breast screening declined      Colon cancer screening declined        Labs this week.          BMI:   Estimated body mass index is 36.81 kg/m  as calculated from the following:    Height as of 6/6/22: 1.562 m (5' 1.5\").    Weight as of 6/6/22: 89.8 kg (198 lb).   Weight management plan: Discussed healthy diet and exercise guidelines        Return in about 4 weeks (around 2/23/2023) for Physical Exam.    DEANNE Chen CNP  [facility] NATHALIE Pritchett is a 62 year old, presenting for the following health issues:  Recheck Medication      History of Present Illness       Reason for visit:  Med Check    She eats 2-3 servings of fruits and vegetables daily.She consumes 0 sweetened " beverage(s) daily.She exercises with enough effort to increase her heart rate 9 or less minutes per day.  She exercises with enough effort to increase her heart rate 3 or less days per week. She is missing 1 dose(s) of medications per week.  She is not taking prescribed medications regularly due to cost of medication, side effects and remembering to take.     DM- not monitoring currently, was previous- staying at 116 in am. Testing various times.   HTN-no concerns, no CP  COPD-no concerns. Had Covid in Sept. - lasted 10 days, faired pretty well. Chronic SOB- no changes.   Can do 2 flights of stairs without issue. Can do treadmill for 5 minutes.   Not using inhalers.     Hair loss- coming out in clumps X 1 month, high stress with home remodeling, daughter is manic- chronic issue. No skin changes noted, but feels it is painful when wearing in a clip- had not previous.   Thought due to BP med previously. Thinks it is Lisinopril no Toprol.      Does not want cancer screening.             Review of Systems   Constitutional, HEENT, cardiovascular, pulmonary, gi and gu systems are negative, except as otherwise noted.      Objective           Vitals:  No vitals were obtained today due to virtual visit.    Physical Exam   healthy, alert and no distress  PSYCH: Alert and oriented times 3; coherent speech, normal   rate and volume, able to articulate logical thoughts, able   to abstract reason, no tangential thoughts, no hallucinations   or delusions  Her affect is normal and pleasant  RESP: No cough, no audible wheezing, able to talk in full sentences, but can hear breathing  Remainder of exam unable to be completed due to telephone visits                Phone call duration: 14 minutes

## 2023-01-27 RX ORDER — METOPROLOL SUCCINATE 50 MG/1
TABLET, EXTENDED RELEASE ORAL
Qty: 90 TABLET | Refills: 3 | Status: SHIPPED | OUTPATIENT
Start: 2023-01-27 | End: 2023-02-27 | Stop reason: SINTOL

## 2023-01-27 RX ORDER — TIOTROPIUM BROMIDE 18 UG/1
CAPSULE ORAL; RESPIRATORY (INHALATION)
Qty: 90 CAPSULE | Refills: 3 | Status: SHIPPED | OUTPATIENT
Start: 2023-01-27 | End: 2024-02-26

## 2023-01-27 NOTE — TELEPHONE ENCOUNTER
Prescription approved per [facility] Refill Protocol.  Naima Villafana RN on 1/27/2023 at 11:31 AM

## 2023-01-30 ENCOUNTER — LAB (OUTPATIENT)
Dept: LAB | Facility: CLINIC | Age: 63
End: 2023-01-30
Payer: COMMERCIAL

## 2023-01-30 DIAGNOSIS — E11.69 TYPE 2 DIABETES MELLITUS WITH OTHER SPECIFIED COMPLICATION, WITHOUT LONG-TERM CURRENT USE OF INSULIN (H): ICD-10-CM

## 2023-01-30 DIAGNOSIS — I10 ESSENTIAL HYPERTENSION: ICD-10-CM

## 2023-01-30 DIAGNOSIS — L65.9 HAIR LOSS: ICD-10-CM

## 2023-01-30 LAB
ALBUMIN SERPL-MCNC: 4 G/DL (ref 3.4–5)
ALP SERPL-CCNC: 83 U/L (ref 40–150)
ALT SERPL W P-5'-P-CCNC: 24 U/L (ref 0–50)
ANION GAP SERPL CALCULATED.3IONS-SCNC: 3 MMOL/L (ref 3–14)
AST SERPL W P-5'-P-CCNC: 15 U/L (ref 0–45)
BASOPHILS # BLD AUTO: 0 10E3/UL (ref 0–0.2)
BASOPHILS NFR BLD AUTO: 0 %
BILIRUB SERPL-MCNC: 0.4 MG/DL (ref 0.2–1.3)
BUN SERPL-MCNC: 21 MG/DL (ref 7–30)
CALCIUM SERPL-MCNC: 8.9 MG/DL (ref 8.5–10.1)
CHLORIDE BLD-SCNC: 107 MMOL/L (ref 94–109)
CO2 SERPL-SCNC: 33 MMOL/L (ref 20–32)
CREAT SERPL-MCNC: 0.78 MG/DL (ref 0.52–1.04)
EOSINOPHIL # BLD AUTO: 0.2 10E3/UL (ref 0–0.7)
EOSINOPHIL NFR BLD AUTO: 2 %
ERYTHROCYTE [DISTWIDTH] IN BLOOD BY AUTOMATED COUNT: 12.4 % (ref 10–15)
GFR SERPL CREATININE-BSD FRML MDRD: 86 ML/MIN/1.73M2
GLUCOSE BLD-MCNC: 175 MG/DL (ref 70–99)
HBA1C MFR BLD: 6.3 % (ref 0–5.6)
HCT VFR BLD AUTO: 52.8 % (ref 35–47)
HGB BLD-MCNC: 17.2 G/DL (ref 11.7–15.7)
IMM GRANULOCYTES # BLD: 0 10E3/UL
IMM GRANULOCYTES NFR BLD: 0 %
LYMPHOCYTES # BLD AUTO: 2.5 10E3/UL (ref 0.8–5.3)
LYMPHOCYTES NFR BLD AUTO: 29 %
MCH RBC QN AUTO: 30.8 PG (ref 26.5–33)
MCHC RBC AUTO-ENTMCNC: 32.6 G/DL (ref 31.5–36.5)
MCV RBC AUTO: 95 FL (ref 78–100)
MONOCYTES # BLD AUTO: 0.5 10E3/UL (ref 0–1.3)
MONOCYTES NFR BLD AUTO: 6 %
NEUTROPHILS # BLD AUTO: 5.5 10E3/UL (ref 1.6–8.3)
NEUTROPHILS NFR BLD AUTO: 63 %
PLATELET # BLD AUTO: 270 10E3/UL (ref 150–450)
POTASSIUM BLD-SCNC: 4.2 MMOL/L (ref 3.4–5.3)
PROT SERPL-MCNC: 7.1 G/DL (ref 6.8–8.8)
RBC # BLD AUTO: 5.58 10E6/UL (ref 3.8–5.2)
SODIUM SERPL-SCNC: 143 MMOL/L (ref 133–144)
TSH SERPL DL<=0.005 MIU/L-ACNC: 1.53 MU/L (ref 0.4–4)
WBC # BLD AUTO: 8.7 10E3/UL (ref 4–11)

## 2023-01-30 PROCEDURE — 36415 COLL VENOUS BLD VENIPUNCTURE: CPT

## 2023-01-30 PROCEDURE — 80050 GENERAL HEALTH PANEL: CPT

## 2023-01-30 PROCEDURE — 83036 HEMOGLOBIN GLYCOSYLATED A1C: CPT

## 2023-01-31 ENCOUNTER — TELEPHONE (OUTPATIENT)
Dept: FAMILY MEDICINE | Facility: CLINIC | Age: 63
End: 2023-01-31
Payer: COMMERCIAL

## 2023-01-31 NOTE — TELEPHONE ENCOUNTER
RN Triage    Patient Contact    Attempt # 1    Was call answered?  No.  Left message on voicemail with information to call me back.      See below, needs face to face for next visit.   DEANNE Chen CNP     DEANNE Lock CNP   1/31/2023  7:49 AM CST       Labs holding steady. High red blood cell count due to smoking. Re-check in 6 months.   Sincerely,   GARY Chen BSN, RN, PHN  [place]  January 31, 2023

## 2023-02-01 NOTE — TELEPHONE ENCOUNTER
Called and spoke with patient regarding labs. No further questions on these.   She is scheduled for physical on 2/27/23 - this is in person visit.     Sonya MEJIAN, RN  [facility]

## 2023-02-27 ENCOUNTER — OFFICE VISIT (OUTPATIENT)
Dept: FAMILY MEDICINE | Facility: CLINIC | Age: 63
End: 2023-02-27
Payer: COMMERCIAL

## 2023-02-27 VITALS
TEMPERATURE: 98.2 F | WEIGHT: 193.7 LBS | DIASTOLIC BLOOD PRESSURE: 84 MMHG | RESPIRATION RATE: 18 BRPM | HEART RATE: 89 BPM | HEIGHT: 62 IN | BODY MASS INDEX: 35.64 KG/M2 | OXYGEN SATURATION: 91 % | SYSTOLIC BLOOD PRESSURE: 138 MMHG

## 2023-02-27 DIAGNOSIS — E11.69 TYPE 2 DIABETES MELLITUS WITH OTHER SPECIFIED COMPLICATION, WITHOUT LONG-TERM CURRENT USE OF INSULIN (H): ICD-10-CM

## 2023-02-27 DIAGNOSIS — I10 ESSENTIAL HYPERTENSION: ICD-10-CM

## 2023-02-27 DIAGNOSIS — Z00.00 ROUTINE MEDICAL EXAM: Primary | ICD-10-CM

## 2023-02-27 DIAGNOSIS — J44.9 CHRONIC OBSTRUCTIVE PULMONARY DISEASE, UNSPECIFIED COPD TYPE (H): ICD-10-CM

## 2023-02-27 LAB
ANION GAP SERPL CALCULATED.3IONS-SCNC: 4 MMOL/L (ref 3–14)
BUN SERPL-MCNC: 23 MG/DL (ref 7–30)
CALCIUM SERPL-MCNC: 9.1 MG/DL (ref 8.5–10.1)
CHLORIDE BLD-SCNC: 105 MMOL/L (ref 94–109)
CO2 SERPL-SCNC: 29 MMOL/L (ref 20–32)
CREAT SERPL-MCNC: 0.66 MG/DL (ref 0.52–1.04)
GFR SERPL CREATININE-BSD FRML MDRD: >90 ML/MIN/1.73M2
GLUCOSE BLD-MCNC: 98 MG/DL (ref 70–99)
POTASSIUM BLD-SCNC: 4.1 MMOL/L (ref 3.4–5.3)
SODIUM SERPL-SCNC: 138 MMOL/L (ref 133–144)

## 2023-02-27 PROCEDURE — 99396 PREV VISIT EST AGE 40-64: CPT | Performed by: NURSE PRACTITIONER

## 2023-02-27 PROCEDURE — 99207 PR FOOT EXAM NO CHARGE: CPT | Mod: 25 | Performed by: NURSE PRACTITIONER

## 2023-02-27 PROCEDURE — 36415 COLL VENOUS BLD VENIPUNCTURE: CPT | Performed by: NURSE PRACTITIONER

## 2023-02-27 PROCEDURE — 80048 BASIC METABOLIC PNL TOTAL CA: CPT | Performed by: NURSE PRACTITIONER

## 2023-02-27 PROCEDURE — 99214 OFFICE O/P EST MOD 30 MIN: CPT | Mod: 25 | Performed by: NURSE PRACTITIONER

## 2023-02-27 RX ORDER — IPRATROPIUM BROMIDE AND ALBUTEROL 20; 100 UG/1; UG/1
1 SPRAY, METERED RESPIRATORY (INHALATION) 2 TIMES DAILY
Qty: 12 G | Refills: 3 | Status: SHIPPED | OUTPATIENT
Start: 2023-02-27 | End: 2024-05-02

## 2023-02-27 RX ORDER — FLUTICASONE PROPIONATE AND SALMETEROL 500; 50 UG/1; UG/1
1 POWDER RESPIRATORY (INHALATION) EVERY 12 HOURS
Qty: 3 EACH | Refills: 3 | Status: SHIPPED | OUTPATIENT
Start: 2023-02-27 | End: 2023-06-07

## 2023-02-27 RX ORDER — LOSARTAN POTASSIUM 50 MG/1
50 TABLET ORAL DAILY
Qty: 90 TABLET | Refills: 3 | Status: SHIPPED | OUTPATIENT
Start: 2023-02-27 | End: 2024-05-02 | Stop reason: SINTOL

## 2023-02-27 ASSESSMENT — ENCOUNTER SYMPTOMS
SHORTNESS OF BREATH: 1
MYALGIAS: 1

## 2023-02-27 ASSESSMENT — PAIN SCALES - GENERAL: PAINLEVEL: NO PAIN (0)

## 2023-02-27 NOTE — PROGRESS NOTES
"   SUBJECTIVE:   CC: Yarely is an 62 year old who presents for preventive health visit.   Patient has been advised of split billing requirements and indicates understanding: Yes  Healthy Habits:     Getting at least 3 servings of Calcium per day:  Yes    Bi-annual eye exam:  Yes    Dental care twice a year:  NO    Sleep apnea or symptoms of sleep apnea:  Daytime drowsiness and Excessive snoring    Diet:  Diabetic    Frequency of exercise:  2-3 days/week    Duration of exercise:  Less than 15 minutes    Taking medications regularly:  Yes    Medication side effects:  Other    PHQ-2 Total Score: 0    Additional concerns today:  No    COPD- stable, Does not want nay imaging or follow-up. Pt. Divide she was told her lung disease was so bad that she was to be dead around 2016.   She is not quitting smoking and is aware of the affects of this. Wants to continue on her same medications.     DM- trying to lose weight when she can.   Not checking BG levels at this point. Was in the \"green\" on her meter, so she stopped checking.   Not exercising, is keeping up with her home.     HTN- no chest pain. LEBLANC at times related to COPD.   Edema- comes and goes, is watching her salt intake. Pt. Had hair thinning/loss- she thought from Toprol. This was stopped last month, tolerated Losartan without issue.             Today's PHQ-2 Score:   PHQ-2 ( 1999 Pfizer) 2/27/2023   Q1: Little interest or pleasure in doing things 0   Q2: Feeling down, depressed or hopeless 0   PHQ-2 Score 0   PHQ-2 Total Score (12-17 Years)- Positive if 3 or more points; Administer PHQ-A if positive -   Q1: Little interest or pleasure in doing things Not at all   Q2: Feeling down, depressed or hopeless Not at all   PHQ-2 Score 0           Social History     Tobacco Use     Smoking status: Every Day     Packs/day: 1.00     Years: 39.00     Pack years: 39.00     Types: Cigarettes     Start date: 6/1/1979     Smokeless tobacco: Never   Substance Use Topics     Alcohol " "use: No     If you drink alcohol do you typically have >3 drinks per day or >7 drinks per week? Not applicable    Alcohol Use 2/27/2023   Prescreen: >3 drinks/day or >7 drinks/week? Not Applicable       Reviewed orders with patient.  Reviewed health maintenance and updated orders accordingly - Yes  Pt. Is not wanting cancer screenings.     Breast Cancer Screening:    Breast CA Risk Assessment (FHS-7) 4/12/2021   Do you have a family history of breast, colon, or ovarian cancer? No / Unknown         Mammogram Screening: Recommended mammography every 1-2 years with patient discussion and risk factor consideration  Pertinent mammograms are reviewed under the imaging tab.    History of abnormal Pap smear: NO - age 30-65 PAP every 5 years with negative HPV co-testing recommended  Last 3 Pap Results: No results found for: PAP  Last 3 Pap and HPV Results:       Reviewed and updated as needed this visit by clinical staff    Allergies  Meds              Reviewed and updated as needed this visit by Provider                     Review of Systems   Eyes: Positive for visual disturbance.   Respiratory: Positive for shortness of breath.    Cardiovascular: Positive for peripheral edema.   Musculoskeletal: Positive for myalgias.     Edema- is stable.  getting new glasses.   COPD- SOB- chronic, is actually better the past couple months.   Normal aches and pains  Other ROS is negative.      OBJECTIVE:   /84   Pulse 89   Temp 98.2  F (36.8  C) (Temporal)   Resp 18   Ht 1.575 m (5' 2\")   Wt 87.9 kg (193 lb 11.2 oz)   LMP  (LMP Unknown)   SpO2 91%   BMI 35.43 kg/m    Physical Exam  GENERAL APPEARANCE: healthy, alert and no distress  EYES: Eyes grossly normal to inspection, PERRL and conjunctivae and sclerae normal  HENT: ear canals and TM's normal, nose and mouth without ulcers or lesions, oropharynx clear and oral mucous membranes moist  NECK: no adenopathy, no asymmetry, masses, or scars and thyroid normal to " palpation  RESP: lungs clear to auscultation - no rales, rhonchi or wheezes  CV: regular rate and rhythm, normal S1 S2, no S3 or S4, no murmur, click or rub, mild LE generalized peripheral edema and peripheral pulses strong, hyperpigmentation proximal to ankles noted  ABDOMEN: soft, nontender, no hepatosplenomegaly, no masses and bowel sounds normal  MS: no musculoskeletal defects are noted and gait is age appropriate without ataxia  SKIN: no suspicious lesions or rashes  NEURO: Normal strength and tone, sensory exam grossly normal, mentation intact and speech normal  PSYCH: mentation appears normal and affect normal/bright  Foot exam: pedal pulses normal, monofilament exam: normal, mild loss on heels noted, but can feel monfilament  No ulcers or open lesions noted     Diagnostic Test Results:  Labs reviewed in Epic    ASSESSMENT/PLAN:       ICD-10-CM    1. Routine medical exam  Z00.00       2. Type 2 diabetes mellitus with other specified complication, without long-term current use of insulin (H)  E11.69 FOOT EXAM      3. Chronic obstructive pulmonary disease, unspecified COPD type (H)  J44.9 fluticasone-salmeterol (WIXELA INHUB) 500-50 MCG/ACT inhaler     ipratropium-albuterol (COMBIVENT RESPIMAT)  MCG/ACT inhaler      4. Essential hypertension  I10 losartan (COZAAR) 50 MG tablet     Basic metabolic panel     Basic metabolic panel          Patient has been advised of split billing requirements and indicates understanding: Yes      COUNSELING:  Reviewed preventive health counseling, as reflected in patient instructions       Regular exercise       Healthy diet/nutrition       Vision screening       Osteoporosis prevention/bone health       cancer prevention- revewed, patient is declining.    Discussed weight loss to decrease some lung burden.     Lung CT- pt. Declines. Disucssed coverage and pt. Is not wanting lung evaluations.     HTN- stable, updating labs, edema with low sodium intake is controlled,  discussed LE venous stasis.  Updating BMP as was switched to losartan.    COPD- severe, stable currently not exacerbated. Cessation as able. Continue plan.     DM- stable, discussed weight and healthy eating. Exercise as able.     Pt. Does not want other cancer screenings.     Medications refilled.     She reports that she has been smoking cigarettes. She started smoking about 43 years ago. She has a 39.00 pack-year smoking history. She has never used smokeless tobacco.  Nicotine/Tobacco Cessation Plan:   Information offered: Patient not interested at this time      DEANNE Chen CNP  [facility]

## 2023-04-19 DIAGNOSIS — J44.9 CHRONIC OBSTRUCTIVE PULMONARY DISEASE, UNSPECIFIED COPD TYPE (H): ICD-10-CM

## 2023-04-21 RX ORDER — ALBUTEROL SULFATE 90 UG/1
AEROSOL, METERED RESPIRATORY (INHALATION)
Qty: 18 G | Refills: 1 | Status: SHIPPED | OUTPATIENT
Start: 2023-04-21 | End: 2023-10-12

## 2023-04-21 NOTE — TELEPHONE ENCOUNTER
Prescription approved per [facility] Refill Protocol.  Nicole Lopez RN on 4/21/2023 at 9:54 AM

## 2023-06-07 DIAGNOSIS — J44.9 CHRONIC OBSTRUCTIVE PULMONARY DISEASE, UNSPECIFIED COPD TYPE (H): ICD-10-CM

## 2023-06-07 RX ORDER — FLUTICASONE PROPIONATE AND SALMETEROL 500; 50 UG/1; UG/1
POWDER RESPIRATORY (INHALATION)
Qty: 3 EACH | Refills: 3 | Status: SHIPPED | OUTPATIENT
Start: 2023-06-07 | End: 2023-11-09

## 2023-07-05 ENCOUNTER — TELEPHONE (OUTPATIENT)
Dept: FAMILY MEDICINE | Facility: CLINIC | Age: 63
End: 2023-07-05
Payer: COMMERCIAL

## 2023-07-05 NOTE — TELEPHONE ENCOUNTER
Patient Returning Call    Reason for call:  Patient would like a call back to discuss restarting Wixela and bloody noses    Information relayed to patient:  Message placed    Patient has additional questions:  No      Okay to leave a detailed message?: Yes at Cell number on file:    Telephone Information:   Mobile [phone]

## 2023-07-06 ENCOUNTER — TELEPHONE (OUTPATIENT)
Dept: FAMILY MEDICINE | Facility: CLINIC | Age: 63
End: 2023-07-06
Payer: COMMERCIAL

## 2023-07-06 NOTE — TELEPHONE ENCOUNTER
Pt. Has Wixela RX's.  Triage epistaxis- needs BP check- at home OK. If actively bleeding needs UC. Can have referral to ENT if needed.   DEANNE Chen CNP

## 2023-07-06 NOTE — TELEPHONE ENCOUNTER
Patient calling stating that the Wixela works better than the Advair. The patient was informed this was sent to her pharmacy. Patient had no additional questions.       ROBERTO HillN, RN, PHN  [place] [facility]  July 6, 2023

## 2023-07-06 NOTE — TELEPHONE ENCOUNTER
Patient Contact    Attempt # 1    Was call answered?  No.  Left message on voicemail with information to call me back.    Sonya MEJIAN, RN  [facility]

## 2023-07-06 NOTE — TELEPHONE ENCOUNTER
Contacted patient. She states that the bloody noses quit. They stopped yesterday. She states the last one she had was Tuesday night 7/4. She doesn't know where her BP machine is now but will look for it and call back with a BP machine.     Patient states that they gave her Advair instead of Wixela. Advised patient that Wixela is the generic for Advair.     Nicole Lopez, ROBERTON, RN, PHN  Registered Nurse-Clinic Triage  [facility]  7/6/2023 at 9:45 AM

## 2023-07-07 NOTE — TELEPHONE ENCOUNTER
RN Triage    Patient Contact    Attempt # 1    Was call answered?  No.  Left message on voicemail with information to call me back.    Upon patient call back please ask if patient was able to take at home BP if not able to take BP at home schedule in clinic BP check. Ask if nose bleeds have returned or if patient has any new symptoms.    KAMRYN Obrien, RN  [facility] ~ Registered Nurse  Clinic Triage ~ [place] [facility] [place]  July 7, 2023

## 2023-07-10 ENCOUNTER — TELEPHONE (OUTPATIENT)
Dept: FAMILY MEDICINE | Facility: CLINIC | Age: 63
End: 2023-07-10
Payer: COMMERCIAL

## 2023-07-10 NOTE — TELEPHONE ENCOUNTER
Patient calling back after receiving a voicemail from the clinic. Upon review of chart it looks like staff was calling for an updated home BP. Patient says she will find her BP machine and call back with this.     Also has questions regarding Wixela. Says when she spoke with a staff member last week that this was available at the pharmacy. She went to the pharmacy and they told her this was not there for her to get. Informed patient that this RN will call pharmacy to clarify. Patient indicated to leave message if she does not answer upon call back.     Called and spoke with pharmacy. They indicated her insurance only covers Advair (alternative for Wixela) and she picked up Advair on 6/14/23. Has enough refills to get her through June 2024.     Left detailed message explaining this to patient and to call back with further questions.     Sonya HARRY, RN  [facility]

## 2023-07-10 NOTE — TELEPHONE ENCOUNTER
RN Triage reached out 2x and unable to reach patient. If the patient returns call please transfer to triage.     Patient Contact    Attempt # 2    Was call answered?  No.  Left message on voicemail with information to call me back.    Upon patient call back please ask if patient was able to take at home BP if not able to take BP at home schedule in clinic BP check. Ask if nose bleeds have returned or if patient has any new symptoms.    KAMRYN Hill, RN, PHN  [place] [facility]  July 10, 2023

## 2023-08-01 ENCOUNTER — TELEPHONE (OUTPATIENT)
Dept: FAMILY MEDICINE | Facility: CLINIC | Age: 63
End: 2023-08-01

## 2023-08-01 DIAGNOSIS — Z13.220 SCREENING FOR LIPOID DISORDERS: ICD-10-CM

## 2023-08-01 DIAGNOSIS — E11.69 TYPE 2 DIABETES MELLITUS WITH OTHER SPECIFIED COMPLICATION, WITHOUT LONG-TERM CURRENT USE OF INSULIN (H): Primary | ICD-10-CM

## 2023-08-01 NOTE — LETTER
[facility]  [address], [address]  [place] [address]  Phone: [phone]  Fax: [phone]  August 1, 2023      Yarely Dia  [address] [facility] [address]      Dear Yarely,    We care about your health and have reviewed your health plan including your medical conditions, medications, and lab results.  Based on this review, it is recommended that you follow up regarding the following health topic(s):  -Breast Cancer Screening  -Colon Cancer Screening    We recommend you take the following action(s):  -schedule a MAMMOGRAM which is due. Please disregard this reminder if you have had this exam elsewhere within the last 1-2 years please let us know so we can update your records.  -schedule a COLONOSCOPY to look for colon cancer (due every 10 years or 5 years in higher risk situations.)  Colonoscopies can prevent 90-95% of colon cancer deaths.  Problem lesions can be removed before they ever become cancer.  If you do not wish to do a colonoscopy or cannot afford to do one at this time, there is another option called a Fecal Immunochemical Occult Blood Test (FIT) a take home stool sample kit.  It does not replace the colonoscopy for colorectal cancer screening, but it can detect hidden bleeding in the lower colon.  It does need to be repeated every year and if a positive result is obtained, you would be referred for a colonoscopy.  If you have completed either one of these tests at another facility, please have the records sent to our clinic for our records.     Please call us at the [facility] [phone] (or use [employer]) to address the above recommendations.     Thank you for trusting [facility] and we appreciate the opportunity to serve you.  We look forward to supporting your healthcare needs in the future.    Healthy Regards,    Your Health Care Team  [facility]

## 2023-08-01 NOTE — TELEPHONE ENCOUNTER
Patient Quality Outreach    Patient is due for the following:   Diabetes -  LDL (Fasting), A1C, and Microalbumin  Colon Cancer Screening  Breast Cancer Screening - Mammogram      Topic Date Due    Zoster (Shingles) Vaccine (1 of 2) Never done    COVID-19 Vaccine (3 - Pfizer series) 06/23/2021       Next Steps:   Schedule a lab only visit for lipids, A1c, and microalbumin (urine)      Type of outreach:    Call to schedule fasting lab only w/ urine.  Sent a letter regarding cancer screenings  If no return call in 1 week send letter about labs and close.      Questions for provider review:    None           Alejandrina Underwood, [facility]  Chart routed to Care Team.

## 2023-08-02 NOTE — TELEPHONE ENCOUNTER
Left message for patient to return call. When call is returned, please see message below and assist in scheduling LAB only appointment.

## 2023-08-08 ENCOUNTER — ALLIED HEALTH/NURSE VISIT (OUTPATIENT)
Dept: FAMILY MEDICINE | Facility: CLINIC | Age: 63
End: 2023-08-08
Payer: COMMERCIAL

## 2023-08-08 ENCOUNTER — LAB (OUTPATIENT)
Dept: LAB | Facility: CLINIC | Age: 63
End: 2023-08-08
Payer: COMMERCIAL

## 2023-08-08 ENCOUNTER — TELEPHONE (OUTPATIENT)
Dept: FAMILY MEDICINE | Facility: CLINIC | Age: 63
End: 2023-08-08

## 2023-08-08 VITALS — SYSTOLIC BLOOD PRESSURE: 162 MMHG | DIASTOLIC BLOOD PRESSURE: 68 MMHG

## 2023-08-08 DIAGNOSIS — Z13.220 SCREENING FOR LIPOID DISORDERS: ICD-10-CM

## 2023-08-08 DIAGNOSIS — I10 ESSENTIAL HYPERTENSION: Primary | ICD-10-CM

## 2023-08-08 DIAGNOSIS — E11.69 TYPE 2 DIABETES MELLITUS WITH OTHER SPECIFIED COMPLICATION, WITHOUT LONG-TERM CURRENT USE OF INSULIN (H): ICD-10-CM

## 2023-08-08 LAB
CHOLEST SERPL-MCNC: 192 MG/DL
CREAT UR-MCNC: 66.4 MG/DL
HBA1C MFR BLD: 5.9 % (ref 0–5.6)
HDLC SERPL-MCNC: 38 MG/DL
LDLC SERPL CALC-MCNC: 129 MG/DL
MICROALBUMIN UR-MCNC: <12 MG/L
MICROALBUMIN/CREAT UR: NORMAL MG/G{CREAT}
NONHDLC SERPL-MCNC: 154 MG/DL
TRIGL SERPL-MCNC: 123 MG/DL

## 2023-08-08 PROCEDURE — 82570 ASSAY OF URINE CREATININE: CPT

## 2023-08-08 PROCEDURE — 80061 LIPID PANEL: CPT

## 2023-08-08 PROCEDURE — 36415 COLL VENOUS BLD VENIPUNCTURE: CPT

## 2023-08-08 PROCEDURE — 83036 HEMOGLOBIN GLYCOSYLATED A1C: CPT

## 2023-08-08 PROCEDURE — 82043 UR ALBUMIN QUANTITATIVE: CPT

## 2023-08-08 PROCEDURE — 99207 PR NO CHARGE NURSE ONLY: CPT

## 2023-08-08 NOTE — TELEPHONE ENCOUNTER
"Left message for pt to return call, when call is returned give information below and schedule follow up med check in 2-3 months with PCP.    \"A1c has improved. Continue with plans discussed with PCP from last appointment. Plan to schedule medication management appointment in 2-3 months.        DM- stable, discussed weight and healthy eating. Exercise as able.\"    Alejandrina Underwood CMA ([facility])      "

## 2023-08-08 NOTE — PROGRESS NOTES
Chief Complaint   Patient presents with    Allied Health Visit   Yarely Dia is a 63 year old patient who comes in today for a Blood Pressure check.  Initial BP:  BP (!) 162/68   LMP  (LMP Unknown)      Data Unavailable  Disposition: results routed to provider    Patient declined waiting for another 10-15 minutes to recheck blood pressure as she stated it would only be higher as she gets really anxious at the doctor's office. Patient denies headaches, shortness of breath, chest pain, dizziness/light headedness, or vision changes.    Alejandrina Underwood CMA ([facility])

## 2023-08-08 NOTE — LETTER
August 9, 2023      Yarely Dia  [address] [facility] [address]        Dear ,    We are writing to inform you of your test results.    Please call the patient, A1c has improved. Continue with plans discussed with PCP from last appointment. Plan to schedule medication management appointment in 2-3 months.     DM- stable, discussed weight and healthy eating. Exercise as able.    Resulted Orders   Hemoglobin A1c   Result Value Ref Range    Hemoglobin A1C 5.9 (H) 0.0 - 5.6 %      Comment:      Normal <5.7%   Prediabetes 5.7-6.4%    Diabetes 6.5% or higher     Note: Adopted from ADA consensus guidelines.       If you have any questions or concerns, please call the clinic at the number listed above.       Sincerely,      [employer] Team

## 2023-08-08 NOTE — TELEPHONE ENCOUNTER
----- Message from Ajit Mary MD sent at 8/8/2023  3:54 PM CDT -----  Please call the patient, A1c has improved. Continue with plans discussed with PCP from last appointment. Plan to schedule medication management appointment in 2-3 months.      DM- stable, discussed weight and healthy eating. Exercise as able.

## 2023-08-08 NOTE — RESULT ENCOUNTER NOTE
Please call the patient, A1c has improved. Continue with plans discussed with PCP from last appointment. Plan to schedule medication management appointment in 2-3 months.      DM- stable, discussed weight and healthy eating. Exercise as able.

## 2023-08-09 NOTE — TELEPHONE ENCOUNTER
Yarely called back and did read the results of her labs back to her.    She was going to schedule her appt herself.  No further questions or concerns from patient.    Closing encounter    Shannen Barnett RN  [facility] ~ Registered Nurse  Clinic Triage ~ [place] [facility] [place]  August 9, 2023

## 2023-10-11 DIAGNOSIS — J44.9 CHRONIC OBSTRUCTIVE PULMONARY DISEASE, UNSPECIFIED COPD TYPE (H): ICD-10-CM

## 2023-10-12 RX ORDER — ALBUTEROL SULFATE 90 UG/1
AEROSOL, METERED RESPIRATORY (INHALATION)
Qty: 8.5 G | Refills: 1 | Status: SHIPPED | OUTPATIENT
Start: 2023-10-12 | End: 2024-02-15

## 2023-11-09 ENCOUNTER — TELEPHONE (OUTPATIENT)
Dept: FAMILY MEDICINE | Facility: CLINIC | Age: 63
End: 2023-11-09
Payer: COMMERCIAL

## 2023-11-09 DIAGNOSIS — J44.9 CHRONIC OBSTRUCTIVE PULMONARY DISEASE, UNSPECIFIED COPD TYPE (H): ICD-10-CM

## 2023-11-09 RX ORDER — FLUTICASONE PROPIONATE AND SALMETEROL 500; 50 UG/1; UG/1
1 POWDER RESPIRATORY (INHALATION) EVERY 12 HOURS
Qty: 3 EACH | Refills: 0 | Status: SHIPPED | OUTPATIENT
Start: 2023-11-09 | End: 2024-02-15

## 2023-11-09 NOTE — TELEPHONE ENCOUNTER
Patient Returning Call    Reason for call:  Pt wants to talk about the medication.     Information relayed to patient:  PCP will call.     Patient has additional questions:  No    What are your questions/concerns:  Pt did not state. Only that is was about the medication.     Okay to leave a detailed message?: Yes at Cell number on file:    Telephone Information:   Mobile [phone]

## 2023-11-09 NOTE — TELEPHONE ENCOUNTER
"Patient call in with concerns of prescription for Advair.     Patient states, \"I used to be on Wixela and then Susan Varma changed me to Advair and it has not worked since I started it. I have to use it every 8hrs vs. 12hrs so I am also going through it more quickly. I don't know why she changed me to Adviar.\" RN advised it is recommended to only use medication as frequently as directed.     RN reviewed chart, PCP prescribed Wixela. RN advised that pharmacy may have substituted with an approved generic based on insurance coverage or supply    RN further reviewed chart and telephone encounter from 7/10/23 notes insurance does not cover Wixela.     Patient reports she will pay for generic out of pocket, \"I just want my medication to work.\"    Patient also reports that she would like to fill at [facility] in [place] vs. [facility].    Patient is completely out of medication at this time and pharmacy will not fill until the 11/15/2023 as she is not due for a refill (has been using more frequently then written for.)    Routing to PCP high priority as patient is out of medication and pharmacy will not fill script as refill too soon.     Appropriate to send Wixela as NICOLE to [facility] pharmacy in [place]? RN pended medication but did not check NICOLE box.       "

## 2023-11-27 ENCOUNTER — TELEPHONE (OUTPATIENT)
Dept: FAMILY MEDICINE | Facility: CLINIC | Age: 63
End: 2023-11-27
Payer: COMMERCIAL

## 2023-11-27 DIAGNOSIS — J44.9 CHRONIC OBSTRUCTIVE PULMONARY DISEASE, UNSPECIFIED COPD TYPE (H): Primary | ICD-10-CM

## 2023-11-27 RX ORDER — ALBUTEROL SULFATE 90 UG/1
2 AEROSOL, METERED RESPIRATORY (INHALATION) EVERY 4 HOURS PRN
Qty: 18 G | Refills: 3 | Status: SHIPPED | OUTPATIENT
Start: 2023-11-27 | End: 2024-05-02 | Stop reason: ALTCHOICE

## 2023-11-27 NOTE — TELEPHONE ENCOUNTER
Patient is requesting that the albuterol inhaler be sent NICOEL.  Doesn't want the generic.  States the brand albuterol works best for her.  States will pay out of pocket for it.    [facility] in [place].  Please advise.  Naomi WOOTEN RN

## 2023-11-30 DIAGNOSIS — J44.1 COPD EXACERBATION (H): ICD-10-CM

## 2023-11-30 DIAGNOSIS — J43.9 PULMONARY EMPHYSEMA, UNSPECIFIED EMPHYSEMA TYPE (H): ICD-10-CM

## 2023-12-01 RX ORDER — IPRATROPIUM BROMIDE AND ALBUTEROL SULFATE 2.5; .5 MG/3ML; MG/3ML
SOLUTION RESPIRATORY (INHALATION)
Qty: 90 ML | Refills: 0 | Status: SHIPPED | OUTPATIENT
Start: 2023-12-01 | End: 2024-05-24

## 2024-02-15 ENCOUNTER — VIRTUAL VISIT (OUTPATIENT)
Dept: FAMILY MEDICINE | Facility: CLINIC | Age: 64
End: 2024-02-15
Payer: COMMERCIAL

## 2024-02-15 DIAGNOSIS — J43.2 CENTRILOBULAR EMPHYSEMA (H): Primary | ICD-10-CM

## 2024-02-15 DIAGNOSIS — J44.9 CHRONIC OBSTRUCTIVE PULMONARY DISEASE, UNSPECIFIED COPD TYPE (H): ICD-10-CM

## 2024-02-15 DIAGNOSIS — Z53.20 ADULT HEALTH EXAMINATION DECLINED: ICD-10-CM

## 2024-02-15 DIAGNOSIS — Z72.0 TOBACCO ABUSE: ICD-10-CM

## 2024-02-15 DIAGNOSIS — E11.65 TYPE 2 DIABETES MELLITUS WITH HYPERGLYCEMIA, WITHOUT LONG-TERM CURRENT USE OF INSULIN (H): ICD-10-CM

## 2024-02-15 PROCEDURE — 99441 PR PHYSICIAN TELEPHONE EVALUATION 5-10 MIN: CPT | Performed by: NURSE PRACTITIONER

## 2024-02-15 RX ORDER — ALBUTEROL SULFATE 90 UG/1
AEROSOL, METERED RESPIRATORY (INHALATION)
Qty: 54 G | Refills: 3 | Status: SHIPPED | OUTPATIENT
Start: 2024-02-15

## 2024-02-15 RX ORDER — FLUTICASONE FUROATE AND VILANTEROL TRIFENATATE 200; 25 UG/1; UG/1
1 POWDER RESPIRATORY (INHALATION) DAILY
Qty: 3 EACH | Refills: 3 | Status: SHIPPED | OUTPATIENT
Start: 2024-02-15 | End: 2024-05-02 | Stop reason: ALTCHOICE

## 2024-02-15 ASSESSMENT — ASTHMA QUESTIONNAIRES: ACT_TOTALSCORE: 9

## 2024-02-15 NOTE — PROGRESS NOTES
"    Instructions Relayed to Patient by Virtual Roomer:     If patient is not active on [email or web address]:  Relayed the following to patient: \"Would you like us to text or email you a link to join your appointment now or when your provider is ready to initiate the virtual visit?\"    Reminded patient to ensure they were logged on to virtual visit by arrival time listed. Documented in appointment notes if patient had flexibility to initiate visit sooner than arrival time. If pediatric virtual visit, ensured pediatric patient along with parent/guardian will be present for video visit.     Patient offered the website [email or web address] and/or phone number to [email or web address] Help line: [phone]    Yarely is a 63 year old who is being evaluated via a billable telephone visit.      What phone number would you like to be contacted at? [phone]  How would you like to obtain your AVS? Mail a copy    [place] Location (provider location):  Off-site    Assessment & Plan     Type 2 diabetes mellitus with hyperglycemia, without long-term current use of insulin (H)  Declined medication or assessment for this.  Due for labs.  Physical exam.    Chronic obstructive pulmonary disease, unspecified COPD type (H)  Severe, refilled medications.  Updating COPD action plan.    - ipratropium-albuterol (COMBIVENT RESPIMAT)  MCG/ACT inhaler; 1 puff 3-4 times daily  - albuterol (PROAIR HFA/PROVENTIL HFA/VENTOLIN HFA) 108 (90 Base) MCG/ACT inhaler; INHALE 2 PUFFS BY MOUTH EVERY 4 HOURS AS NEEDED FOR SHORTNESS OF BREATH OR DIFFICULT BREATHING    Centrilobular emphysema (H)  As above.  - fluticasone-vilanterol (BREO ELLIPTA) 200-25 MCG/ACT inhaler; Inhale 1 puff into the lungs daily    Tobacco abuse  Patient declined aid with cessation.    Adult health examination declined  Patient declined physical and other Healthcare maintenance updating.            Nicotine/Tobacco Cessation  She reports that she has been smoking cigarettes. She " "started smoking about 44 years ago. She has a 39 pack-year smoking history. She has never used smokeless tobacco.  Nicotine/Tobacco Cessation Plan  Information offered: Patient not interested at this time      BMI  Estimated body mass index is 35.43 kg/m  as calculated from the following:    Height as of 2/27/23: 1.575 m (5' 2\").    Weight as of 2/27/23: 87.9 kg (193 lb 11.2 oz).   Weight management plan: Patient declined aid with this.      MEDICATIONS:        - Continue other medications without change    Aiden Pritchett is a 63 year old, presenting for the following health issues:  Asthma      2/15/2024     1:17 PM   Additional Questions   Roomed by josiah   Accompanied by self     History of Present Illness     Asthma:  She presents for follow up of asthma.  She has some cough, no wheezing, and some shortness of breath.  She is using a relief medication 2-3 times per day. She does not have a controller medication. Patient is aware of the following triggers: same as previous visit. The patient has not had a visit to the Emergency Room, Urgent Care or Hospital due to asthma since the last clinic visit.      Pt says her asthma medication doesn't work for 24 hours, it stops about 12 hours in and the Advair is no longer covered by her insurance and is looking for something different.      Oxygen 92-93 % - RA    HTN- reports reports systolic 200+    Has SOB- chronic,   Using honey dayquil.           Due for healthcare maintenance, hypertension and diabetic check.  Patient not wanting medications at this time.  She is feeling well and does not want her blood pressure managed.  She had side effects on her losartan and does not want medication in any regard for HTN, statin, mood, etc.       Review of Systems  Constitutional, HEENT, cardiovascular, pulmonary, gi and gu systems are negative, except as otherwise noted.      Objective           Vitals:  No vitals were obtained today due to virtual visit.    Physical Exam "   General: Alert and no distress //Respiratory: No audible wheeze, cough, or shortness of breath // Psychiatric:  Appropriate affect, tone, and pace of words            Phone call duration: 9 minutes  Signed Electronically by: DEANNE Chen CNP

## 2024-02-22 ENCOUNTER — TELEPHONE (OUTPATIENT)
Dept: FAMILY MEDICINE | Facility: CLINIC | Age: 64
End: 2024-02-22
Payer: COMMERCIAL

## 2024-02-22 NOTE — TELEPHONE ENCOUNTER
Patient Quality Outreach    Patient is due for the following:   Diabetes -  A1C, Eye Exam, BP Check, and Foot Exam  Hypertension -  BP check  Colon Cancer Screening  Breast Cancer Screening - Mammogram  Physical Preventive Adult Physical,  - Due after 2/27/2024      Topic Date Due    Zoster (Shingles) Vaccine (1 of 2) Never done    Flu Vaccine (1) 09/01/2023    COVID-19 Vaccine (3 - 2023-24 season) 09/01/2023       Next Steps:   Schedule a Adult Preventative    Type of outreach:    Phone, left message for patient/parent to call back.  Call in 1 week if not returned; send letter in 2 weeks if not returned/read.       Questions for provider review:    None           Alejandrina Underwood, CMA

## 2024-02-26 DIAGNOSIS — J43.2 CENTRILOBULAR EMPHYSEMA (H): ICD-10-CM

## 2024-02-26 RX ORDER — TIOTROPIUM BROMIDE 18 UG/1
CAPSULE ORAL; RESPIRATORY (INHALATION)
Qty: 90 CAPSULE | Refills: 2 | Status: SHIPPED | OUTPATIENT
Start: 2024-02-26

## 2024-05-02 ENCOUNTER — VIRTUAL VISIT (OUTPATIENT)
Dept: FAMILY MEDICINE | Facility: CLINIC | Age: 64
End: 2024-05-02
Payer: COMMERCIAL

## 2024-05-02 DIAGNOSIS — H10.31 ACUTE BACTERIAL CONJUNCTIVITIS OF RIGHT EYE: ICD-10-CM

## 2024-05-02 DIAGNOSIS — J44.1 COPD EXACERBATION (H): Primary | ICD-10-CM

## 2024-05-02 DIAGNOSIS — E11.65 TYPE 2 DIABETES MELLITUS WITH HYPERGLYCEMIA, WITHOUT LONG-TERM CURRENT USE OF INSULIN (H): ICD-10-CM

## 2024-05-02 DIAGNOSIS — J43.2 CENTRILOBULAR EMPHYSEMA (H): ICD-10-CM

## 2024-05-02 DIAGNOSIS — I10 PRIMARY HYPERTENSION: ICD-10-CM

## 2024-05-02 DIAGNOSIS — B37.0 THRUSH: ICD-10-CM

## 2024-05-02 PROCEDURE — 99442 PR PHYSICIAN TELEPHONE EVALUATION 11-20 MIN: CPT | Performed by: NURSE PRACTITIONER

## 2024-05-02 RX ORDER — TOBRAMYCIN 3 MG/ML
1-2 SOLUTION/ DROPS OPHTHALMIC 4 TIMES DAILY
Qty: 5 ML | Refills: 0 | Status: SHIPPED | OUTPATIENT
Start: 2024-05-02 | End: 2024-07-25

## 2024-05-02 RX ORDER — BUDESONIDE AND FORMOTEROL FUMARATE DIHYDRATE 160; 4.5 UG/1; UG/1
2 AEROSOL RESPIRATORY (INHALATION) 2 TIMES DAILY
Qty: 30.6 G | Refills: 2 | Status: SHIPPED | OUTPATIENT
Start: 2024-05-02

## 2024-05-02 RX ORDER — PREDNISONE 20 MG/1
40 TABLET ORAL DAILY
Qty: 10 TABLET | Refills: 0 | Status: SHIPPED | OUTPATIENT
Start: 2024-05-02 | End: 2024-05-07

## 2024-05-02 RX ORDER — AZITHROMYCIN 250 MG/1
TABLET, FILM COATED ORAL
Qty: 6 TABLET | Refills: 0 | Status: SHIPPED | OUTPATIENT
Start: 2024-05-02 | End: 2024-07-26

## 2024-05-02 RX ORDER — CLOTRIMAZOLE 10 MG/1
10 LOZENGE ORAL
Qty: 70 TROCHE | Refills: 0 | Status: SHIPPED | OUTPATIENT
Start: 2024-05-02

## 2024-05-02 NOTE — PATIENT INSTRUCTIONS
Thank you for choosing us for your care. I have placed an order for a prescription so that you can start treatment. View your full visit summary for details by clicking on the link below. Your pharmacist will able to address any questions you may have about the medication.     If you re not feeling better within 2-3 days, please schedule an appointment.  You can schedule an appointment right here in [facility], or call [phone]  If the visit is for the same symptoms as your eVisit, we ll refund the cost of your eVisit if seen within seven days.    Pinkeye: Care Instructions  Overview     Pinkeye is redness and swelling of the eye surface and the conjunctiva (the lining of the eyelid and the covering of the white part of the eye). Pinkeye is also called conjunctivitis. Pinkeye is often caused by infection with bacteria or a virus. Dry air, allergies, smoke, and chemicals are other common causes.  Pinkeye often gets better on its own in 7 to 10 days. Antibiotics only help if the pinkeye is caused by bacteria. Pinkeye caused by infection spreads easily. If an allergy or chemical is causing pinkeye, it will not go away unless you can avoid whatever is causing it.  Follow-up care is a key part of your treatment and safety. Be sure to make and go to all appointments, and call your doctor if you are having problems. It's also a good idea to know your test results and keep a list of the medicines you take.  How can you care for yourself at home?  Wash your hands often. Always wash them before and after you treat pinkeye or touch your eyes or face.  Use moist cotton or a clean, wet cloth to remove crust. Wipe from the inside corner of the eye to the outside. Use a clean part of the cloth for each wipe.  Put cold or warm wet cloths on your eye a few times a day if the eye hurts.  Do not wear contact lenses or eye makeup until the pinkeye is gone. Throw away any eye makeup you were using when you got pinkeye. Clean your  "contacts and storage case. If you wear disposable contacts, use a new pair when your eye has cleared and it is safe to wear contacts again.  If the doctor gave you antibiotic ointment or eyedrops, use them as directed. Use the medicine for as long as instructed, even if your eye starts looking better soon. Keep the bottle tip clean, and do not let it touch the eye area.  To put in eyedrops or ointment:  Tilt your head back, and pull your lower eyelid down with one finger.  Drop or squirt the medicine inside the lower lid.  Close your eye for 30 to 60 seconds to let the drops or ointment move around.  Do not touch the ointment or dropper tip to your eyelashes or any other surface.  Do not share towels, pillows, or washcloths while you have pinkeye.  When should you call for help?   Call your doctor now or seek immediate medical care if:    You have pain in your eye, not just irritation on the surface.     You have a change in vision or loss of vision.     You have an increase in discharge from the eye.     Your eye has not started to improve or begins to get worse within 48 hours after you start using antibiotics.     Pinkeye lasts longer than 7 days.   Watch closely for changes in your health, and be sure to contact your doctor if you have any problems.  Where can you learn more?  Go to [email or web address]  Enter Y392 in the search box to learn more about \"Pinkeye: Care Instructions.\"  Current as of: June 5, 2023               Content Version: 14.0    2159-2818 [employer].   Care instructions adapted under license by your healthcare professional. If you have questions about a medical condition or this instruction, always ask your healthcare professional. [employer] disclaims any warranty or liability for your use of this information.      "

## 2024-05-02 NOTE — PROGRESS NOTES
Instructions Relayed to Patient by Virtual Roomer:     Patient instructed that provider will initiate telephone visit via phone call      Patient Confirmed they will join visit via: Provider to call patient for telephone visit   Reminded patient to ensure they were logged on to virtual visit by arrival time listed.   Asked if patient has flexibility to initiate visit sooner than arrival time: patient stated yes, documented in appointment notes availability to initiate visit earlier than arrival time     If pediatric virtual visit, ensured pediatric patient along with parent/guardian will be present for video visit.     Patient offered the website [email or web address] and/or phone number to [employer] Help line: [phone]    Yarely is a 63 year old who is being evaluated via a billable telephone visit.    What phone number would you like to be contacted at? [phone]   How would you like to obtain your AVS? Mail a copy  Originating Location (pt. Location): Home    Distant Location (provider location):  On-site    Assessment & Plan     (J44.1) COPD exacerbation (H)  (primary encounter diagnosis)  Comment:   Plan: budesonide-formoterol (SYMBICORT) 160-4.5         MCG/ACT Inhaler, azithromycin (ZITHROMAX) 250         MG tablet, predniSONE (DELTASONE) 20 MG tablet        Discussed Smart therapy with Symbicort.  Adding steroid and antibiotic due to oxygen use with COPD, severe stages.  To ED if worse.  Patient to home monitor oxygen use with oximeter.      (E11.65) Type 2 diabetes mellitus with hyperglycemia, without long-term current use of insulin (H)  Comment:   Plan: Uncontrolled.  Patient declining monitoring at this time.  She understands risks.      (B37.0) Thrush  Comment:   Plan: clotrimazole (MYCELEX) 10 MG lozenge        Recurrent withInhaler use.  Medications refilled.    (H10.31) Acute bacterial conjunctivitis of right eye  Comment:   Plan: tobramycin (TOBREX) 0.3 % ophthalmic solution         Tobramycin opth. drops, URI supportive cares. Good handwashing discussed.  Pt. instructed to take medication for two days past resolution of symptoms. Return to clinic with any new or worsening symptoms, and prn.     (J43.2) Centrilobular emphysema (H)  Comment:   Plan: budesonide-formoterol (SYMBICORT) 160-4.5         MCG/ACT Inhaler        Severe, as above SMART therapy.             Hypertension-Elevated BP.  Patientdeclining treatment at this time.    Palliative care consultation offered.  Patient declined.                MEDICATIONS:        - Continue other medications without change    Subjective   Yarely is a 63 year old, presenting for the following health issues:  Conjunctivitis        5/2/2024     7:13 AM   Additional Questions   Roomed by Tio BULLOCK     History of Present Illness       Reason for visit:  [facility]        Eye(s) Problem  Onset/Duration: Symptoms started last Saturday 4/27. Had cataract surgery 2-3 years ago. 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 Oximeter is runnign 92-93% has home oxygen.   Uses when sats get to 88%, using rarely.     More fatigued.   Slow walking, but able to shop.      Severe COPD, hard financial strain.   No edema  DM- not checking sugars- does not want testing.     HTN- not treating- had SA on meds- hair thinning, edema.         Po adequate. No fever suspected, if present- low grade        Review of Systems  Constitutional, HEENT, cardiovascular, pulmonary, gi and gu  systems are negative, except as otherwise noted.      Objective           Vitals:  No vitals were obtained today due to virtual visit.    Physical Exam   General: Alert and no distress //Respiratory: No audible wheeze, coughed- once during visit, able to speak in full sentences- mild hoarse voice// Psychiatric:  Appropriate affect, tone, and pace of words            Phone call duration: 15 minutes  Signed Electronically by: DEANNE Chen CNP

## 2024-05-22 DIAGNOSIS — J43.9 PULMONARY EMPHYSEMA, UNSPECIFIED EMPHYSEMA TYPE (H): ICD-10-CM

## 2024-05-22 DIAGNOSIS — J44.1 COPD EXACERBATION (H): ICD-10-CM

## 2024-05-24 RX ORDER — IPRATROPIUM BROMIDE AND ALBUTEROL SULFATE 2.5; .5 MG/3ML; MG/3ML
SOLUTION RESPIRATORY (INHALATION)
Qty: 90 ML | Refills: 0 | Status: SHIPPED | OUTPATIENT
Start: 2024-05-24 | End: 2024-07-26

## 2024-06-25 ENCOUNTER — TELEPHONE (OUTPATIENT)
Dept: FAMILY MEDICINE | Facility: CLINIC | Age: 64
End: 2024-06-25
Payer: COMMERCIAL

## 2024-06-25 NOTE — TELEPHONE ENCOUNTER
Reason for Call:  Appointment Request    Patient requesting this type of appt: Chronic Diease Management/Medication/Follow-Up    Requested provider: Susan Varma    Reason patient unable to be scheduled: Not within requested timeframe    When does patient want to be seen/preferred time: 3-7 days    Comments: patient is wanting to schedule for a phone appt with PCP to review medications that she states aren't working for her - next available is July 16th- patient is wanting to be seen sooner    Okay to leave a detailed message?: Yes at Home number on file [phone] (home)    Call taken on 6/25/2024 at 10:48 AM by Melisa Ramos

## 2024-06-26 NOTE — TELEPHONE ENCOUNTER
Please inform patient her PCP is out of the office until the second week of July, if she would like to make an appointment with another provider to further discuss in the meantime please help her do so otherwise we will send this message to Susan upon her return for her recommendations  Diann Diaz PA-C

## 2024-06-26 NOTE — TELEPHONE ENCOUNTER
Spoke to patient, patient states she needs an order for nebulizer machine.   She was recently prescribed Symbicort in place of Breo Ellipta inhaler and states that she feels Symbicort works faster, but it is not the same as Advair. She feels she needs to have Breo inhaler back instead of Symbicort. She states she can't walk as far as she used to before having to sit and breathing is worse with humidity.    Scheduled video visit with Kari Ro to discuss medication.     Next 5 appointments (look out 90 days)      Jun 27, 2024 2:00 PM  (Arrive by 1:55 PM)  Provider Visit with Kari Ro PA-C  [facility] ([facility] ) [address], [address]  [place] [address]  [phone]     Jul 30, 2024 4:30 PM  (Arrive by 4:10 PM)  Adult Preventative Visit with DEANNE Lock CNP  [facility] ([facility] ) [address], [address]  [place] [address]  [phone]

## 2024-06-27 ENCOUNTER — VIRTUAL VISIT (OUTPATIENT)
Dept: FAMILY MEDICINE | Facility: CLINIC | Age: 64
End: 2024-06-27
Payer: COMMERCIAL

## 2024-06-27 DIAGNOSIS — R06.09 DOE (DYSPNEA ON EXERTION): Primary | ICD-10-CM

## 2024-06-27 DIAGNOSIS — J44.9 CHRONIC OBSTRUCTIVE PULMONARY DISEASE, UNSPECIFIED COPD TYPE (H): ICD-10-CM

## 2024-06-27 PROCEDURE — 99442 PR PHYSICIAN TELEPHONE EVALUATION 11-20 MIN: CPT | Mod: 95 | Performed by: PHYSICIAN ASSISTANT

## 2024-06-27 NOTE — PROGRESS NOTES
"Yarely is a 63 year old who is being evaluated via a billable video visit.    How would you like to obtain your AVS? MyChart  If the video visit is dropped, the invitation should be resent by: Text to cell phone: [phone]  Will anyone else be joining your video visit? No      Assessment & Plan     LEBLANC (dyspnea on exertion)  Chronic obstructive pulmonary disease, unspecified COPD type (H)  Severe COPD.   Pt had virtual visit 8 weeks ago for COPD exacerbation. She was treated with steroids, zithromax and made changes to inhalers.   She did not have improvement and has been feeling progressively worse over past 8 weeks.  Describing significant LEBLANC.  She is markedly overusing 4 different inhalers without relief   Sats running below 90%  Needs an in person visit with exam. Advised evaluation in the ER.   D/dx- COPD exacerbation, pneumonia, CHF, etc.     I did sent new nebulizer DME order.     F/U: to the ER.     Subjective   Yarely is a 63 year old, presenting for the following health issues:  COPD and Recheck Medication        6/27/2024     1:29 PM   Additional Questions   Roomed by Rosa Elena HONG   Accompanied by None         6/27/2024     1:29 PM   Patient Reported Additional Medications   Patient reports taking the following new medications NA     Pt is requesting new nebulizer  Pt reports that the Advair worked better for her than the Symbicort     HPI     COPD Follow-Up    Pt had virtual visit with PCP 05/02/24 (8 weeks ago). Treated for COPD exacerbation with zithromax, prednisone. Changed inhalers from BREO to symbicort. Feels like symbicort not working.    Did not get better with treatment from last visit.   Progressively feeling worse.   Increased LEBLANC. Can't walk very far without stopping.   \"I'm scaring my coworkers- they wanted to call 911\" because I can't breathe.   I can't catch my breath.   Sleeping upright in chair.   Cough is worse.  Almost collapsed on railing trying to go into library.   Smoking- a pack a " "day.   Humidity makes it much worse.    Current use of inhalers:   Using symbicort 2 puff every 4 hours.  Using Spiriva- using every 8 hours (not once daily) - needing it that often.   Albuterol - every 3 hours  combivent  \"once every 8-12 hours\"  Uses home oxygen when needed- not using it.   Pulse ox 88-91. Overnight at 86%.          Overall, how are your COPD symptoms since your last clinic visit?  Much worse  How much fatigue or shortness of breath do you have when you are walking?  A lot more than usual  How much shortness of breath do you have when you are resting?  More than usual  How often do you cough? Often  Have you noticed any change in your sputum/phlegm?  No  Have you experienced a recent fever? No  Please describe how far you can walk without stopping to rest:  The length of 1-2 rooms  How many flights of stairs are you able to walk up without stopping?  1  Have you had any Emergency Room Visits, Urgent Care Visits, or Hospital Admissions because of your COPD since your last office visit?  No    History   Smoking Status    Every Day    Packs/day: 1.00    Years: 39.00    Types: Cigarettes    Start date: 6/1/1979   Smokeless Tobacco    Never     No results found for: \"FEV1\", \"OAF4PLY\"    Medication Followup of Symbicort   Taking Medication as prescribed: yes  Side Effects:  None  Medication Helping Symptoms:  NO-\"not as well as it could\"          Review of Systems  Constitutional, HEENT, cardiovascular, pulmonary, gi and gu systems are negative, except as otherwise noted.      Objective           Vitals:  No vitals were obtained today due to virtual visit.    Physical Exam   GENERAL: alert and no distress  RESP: + cough. Pauses in speech.   PSYCH: Appropriate affect, tone, and pace of words          Video-Visit Details    Type of service:  Video Visit   Originating Location (pt. Location): Home    Distant Location (provider location):  Off-site  Platform used for Video Visit: Nora  Signed " Electronically by: Kari Ro PA-C

## 2024-07-23 ENCOUNTER — NURSE TRIAGE (OUTPATIENT)
Dept: FAMILY MEDICINE | Facility: CLINIC | Age: 64
End: 2024-07-23
Payer: COMMERCIAL

## 2024-07-23 DIAGNOSIS — J43.9 PULMONARY EMPHYSEMA, UNSPECIFIED EMPHYSEMA TYPE (H): ICD-10-CM

## 2024-07-23 DIAGNOSIS — J44.1 COPD EXACERBATION (H): ICD-10-CM

## 2024-07-24 NOTE — TELEPHONE ENCOUNTER
Patient has had an increase in shortness of breath since before April.    She states her medication was changed. Since then she has had Oxygen levels below 90%. Normally she is around 88% at rest and can go down to 86% with activity.    She will use oxygen when she is less than 85%.  She is using her nebulizer daily.  She has a productive cough with yellowish discharge.  She states she was exposed to covid 2 weeks ago.  No fever.    She states she was prescribes symbicort and this is not helping her symptoms.    Per protocol patient should be seen today, she states she is ok waiting to be seen tomorrow and was encouraged to call back or go in if her symptoms increase.    Naima Villafana RN on 7/24/2024 at 12:45 PM

## 2024-07-24 NOTE — TELEPHONE ENCOUNTER
Unable to fill. Pt. With COPD/exacerbation- has severe disease.   Needs f2f eval. Call pt.- triage.   See prev. Visit with KP  I will not see response today.   DEANNE Chen CNP

## 2024-07-24 NOTE — TELEPHONE ENCOUNTER
Reason for Disposition    Increasing difficulty breathing and always has some difficulty breathing    Additional Information    Negative: Chest pain is main symptom    Negative: Cough and < 3 weeks duration    Negative: Previous asthma attacks and this feels like an asthma attack    Negative: MODERATE difficulty breathing (e.g., speaks in phrases, SOB even at rest, pulse 100-120) and still present when not coughing    Negative: Chest pain  (Exception: MILD central chest pain, present only when coughing.)    Negative: SEVERE difficulty breathing (e.g., struggling for each breath, speaks in single words)    Negative: Lips or face are bluish now and persists when not coughing    Negative: Sounds like a life-threatening emergency to the triager    Protocols used: Cough - Chronic-A-OH

## 2024-07-25 ENCOUNTER — OFFICE VISIT (OUTPATIENT)
Dept: FAMILY MEDICINE | Facility: CLINIC | Age: 64
End: 2024-07-25
Payer: COMMERCIAL

## 2024-07-25 ENCOUNTER — ANCILLARY PROCEDURE (OUTPATIENT)
Dept: GENERAL RADIOLOGY | Facility: CLINIC | Age: 64
End: 2024-07-25
Attending: NURSE PRACTITIONER
Payer: COMMERCIAL

## 2024-07-25 DIAGNOSIS — J44.1 COPD EXACERBATION (H): ICD-10-CM

## 2024-07-25 DIAGNOSIS — E11.69 TYPE 2 DIABETES MELLITUS WITH OTHER SPECIFIED COMPLICATION, WITHOUT LONG-TERM CURRENT USE OF INSULIN (H): ICD-10-CM

## 2024-07-25 DIAGNOSIS — J44.9 CHRONIC OBSTRUCTIVE PULMONARY DISEASE, UNSPECIFIED COPD TYPE (H): ICD-10-CM

## 2024-07-25 DIAGNOSIS — I10 ESSENTIAL HYPERTENSION: ICD-10-CM

## 2024-07-25 DIAGNOSIS — J44.1 COPD EXACERBATION (H): Primary | ICD-10-CM

## 2024-07-25 LAB
BASOPHILS # BLD AUTO: 0 10E3/UL (ref 0–0.2)
BASOPHILS NFR BLD AUTO: 1 %
D DIMER PPP FEU-MCNC: 0.29 UG/ML FEU (ref 0–0.5)
EOSINOPHIL # BLD AUTO: 0.1 10E3/UL (ref 0–0.7)
EOSINOPHIL NFR BLD AUTO: 2 %
ERYTHROCYTE [DISTWIDTH] IN BLOOD BY AUTOMATED COUNT: 11.9 % (ref 10–15)
HCT VFR BLD AUTO: 54.1 % (ref 35–47)
HGB BLD-MCNC: 18 G/DL (ref 11.7–15.7)
IMM GRANULOCYTES # BLD: 0 10E3/UL
IMM GRANULOCYTES NFR BLD: 0 %
LYMPHOCYTES # BLD AUTO: 1.7 10E3/UL (ref 0.8–5.3)
LYMPHOCYTES NFR BLD AUTO: 24 %
MCH RBC QN AUTO: 31.5 PG (ref 26.5–33)
MCHC RBC AUTO-ENTMCNC: 33.3 G/DL (ref 31.5–36.5)
MCV RBC AUTO: 95 FL (ref 78–100)
MONOCYTES # BLD AUTO: 0.5 10E3/UL (ref 0–1.3)
MONOCYTES NFR BLD AUTO: 7 %
NEUTROPHILS # BLD AUTO: 4.6 10E3/UL (ref 1.6–8.3)
NEUTROPHILS NFR BLD AUTO: 67 %
PLATELET # BLD AUTO: 234 10E3/UL (ref 150–450)
RBC # BLD AUTO: 5.72 10E6/UL (ref 3.8–5.2)
WBC # BLD AUTO: 7 10E3/UL (ref 4–11)

## 2024-07-25 PROCEDURE — 85025 COMPLETE CBC W/AUTO DIFF WBC: CPT | Performed by: NURSE PRACTITIONER

## 2024-07-25 PROCEDURE — 85379 FIBRIN DEGRADATION QUANT: CPT | Performed by: NURSE PRACTITIONER

## 2024-07-25 PROCEDURE — G2211 COMPLEX E/M VISIT ADD ON: HCPCS | Performed by: NURSE PRACTITIONER

## 2024-07-25 PROCEDURE — 99214 OFFICE O/P EST MOD 30 MIN: CPT | Performed by: NURSE PRACTITIONER

## 2024-07-25 PROCEDURE — 71046 X-RAY EXAM CHEST 2 VIEWS: CPT | Mod: TC | Performed by: RADIOLOGY

## 2024-07-25 PROCEDURE — 36415 COLL VENOUS BLD VENIPUNCTURE: CPT | Performed by: NURSE PRACTITIONER

## 2024-07-25 ASSESSMENT — PAIN SCALES - GENERAL: PAINLEVEL: NO PAIN (0)

## 2024-07-25 NOTE — PATIENT INSTRUCTIONS
Use oxygen with exertion, up to > 91% oximeter    Symbicort in am and night, OK for Combivent for rescue.     Use thrush medication.

## 2024-07-25 NOTE — PROGRESS NOTES
"SIX MINUTE WALK TEST  [facility]  2024    Yarely Dia MRN# [id number]   YOB: 1960 Age: 63 year old     Height: 5' 1.535\"  Weight (lbs): 191 lbs 11.2 oz Weight (kg): 87 kg (actual weight)    Supplemental oxygen during the test: No,     Oxygen Appliance: None    Oximetry: Finger Probe    Gait Aid: None     Pre-test Post-test   Time 1008 1013   Blood Pressure (mm Hg) 166/95 166/82   Heart rate (bpm) 77 103   Rated Perceived Dyspnea (Evonne Scale) 0.5 -- Very, very slight shortness of breath  3 -- Moderate shortness of breath or breathing difficulty    Rated Perceived Exertion (Evonne Scale) 0.5 -- Very, very weak (just noticeable)  4 -- Somewhat strong    SpO2 (%) 93 88   RR prior 20 and post 28-30bpm  Total distance walked in 6 minutes:  feet,  meters    Paused during test?No  Number of pauses: none  Total Time stopped: 0    Stopped test before 6 minutes? No  Time completed: 6  Distance:2.5 laps around clinic  Reason: n/a    Did the patient experience any pain or discomfort during the test? No  Pain Ratin/10  Pain Description:   Comments: Felt tired    Oxygen Titration Required: not used.   Resting oxygen requirement:  Liters on Nasal Cannula  Exercise oxygen requirement:  Liters on     Performing Staff: Jennifer Mejia RN        "

## 2024-07-25 NOTE — PROGRESS NOTES
Assessment & Plan     Discussed plan of care and expectations of overall health.  Patient is not wanting additional medications or interventions at this time.  She is open to oxygen utilization at home.    Diabetes mellitus, type 2 (H)  -Without long-term use of insulin, with other specified complication.  Circulation disruption with COPD and hypertension uncontrolled.  Patient wishes to not utilize medications.  She is try to keep up with her activity and her lung status currently.  Follow-up as needed.    Essential hypertension  Uncontrolled, patient wishes to not continue medication treatment as previously discussed.  Follow-up as needed.  Patient is aware of health risks with uncontrolled blood pressure.    Chronic obstructive pulmonary disease, unspecified COPD type (H)  Severe status.  Needs oxygen with activity.  Advised continuous use of oxygen to keep sats above 90%.  Patient may self titrate from 1/2 L at rest up to 3 L nasal cannula.  Continuation of 2 L nasal cannula at night.  Patient does not need current oxygen order updated per her report.  - 6 minute walk test; Future    COPD exacerbation (H)  Improving, no concern for PE at this time.  Discussed no current infection is noted.  Medications refilled.  Discussed Symbicort twice daily, and to restart Combivent during the day to avoid oral irritants such as thrush.  Patient has supply of her inhalers.  She does not need clotrimazole atrocious prescribed at this point as she also has a supply of this.    DuoNeb solution is being refilled.  Discussed Spiriva daily dose.  I am not aware that twice daily dosing would be beneficial.    - D dimer, quantitative; Future  - CBC with platelets and differential; Future  - XR Chest 2 Views; Future  - D dimer, quantitative  - CBC with platelets and differential      Patient declines vaccines and healthcare maintenance.    Patient agrees to plan.  Follow-up as necessary.  We did discuss that if she was having  breathing problems outside of her oxygen and her inhalers, that when she calls the clinic ER assessment will be recommended.  She is agreement to plan.Return to clinic with any new or worsening symptoms, and as needed.    The longitudinal plan of care for the diagnosis(es)/condition(s) as documented were addressed during this visit. Due to the added complexity in care, I will continue to support Yarely in the subsequent management and with ongoing continuity of care.        Aiden Pritchett is a 63 year old, presenting for the following health issues:  COPD and Asthma      7/25/2024     8:51 AM   Additional Questions   Roomed by MUNA   Accompanied by self     History of Present Illness     Asthma:  She presents for follow up of asthma.  She has some cough, some wheezing, and some shortness of breath.  She is using a relief medication daily. She does not miss any doses of her controller medication throughout the week. Patient is aware of the following triggers: dust mites, emotions, exercise or sports, humidity, mold, pollens, smoke, strong odors and fumes and upper respiratory infections. The patient has not had a visit to the Emergency Room, Urgent Care or Hospital due to asthma since the last clinic visit.     COPD:  She presents for follow up of COPD.   Overall, COPD symptoms are slightly worse since last visit. She has more than usual fatigue or shortness of breath with exertion and same as usual shortness of breath at rest.  She often coughs and does have change in sputum. No recent fever. She can walk the length of 1-2 rooms without stopping to rest. She can not walk a flight of stairs without resting. The patient has had no ED, urgent care, or hospital admissions because of COPD since the last visit.     Reason for visit:  Copd    She eats 2-3 servings of fruits and vegetables daily.She consumes 0 sweetened beverage(s) daily.She exercises with enough effort to increase her heart rate 9 or less minutes per day.   She exercises with enough effort to increase her heart rate 4 days per week.   She is taking medications regularly.     Patient here for follow-up to exacerbation last month.  Patient states it was quite severe.  She had quite a few dozen episodes that led her lightheaded and cognitively slowed.  She had no syncope or chest pain.  She did have increase in her coughing episodes.  When she would have coughing bronchospasm, she would get short of oxygen with those around her were concerned.  This happened at least twice.  It does occasionally happen at work.    Patient notes she has difficulty reaching 11 stairs at her home.  She would only need an inhaler and to stop and rest for a few minutes at the top of the stairs once doing the stairs.  She denies fever during this time.  Patient has tried to cut way back on her smoking but has not quit.    She does have oxygen at 2 L nasal cannula for sleep.  She has not been utilizing it during the day.    Patient has significant financial strain, as her insurance is not very good.  We discussed that she is likely disabled with her current exacerbations and need for oxygen with exercise.  She is declining to go on disability of any kind at this point.  She also has a difficult time walking to her car and will make arrangements for people to leave her work, so that they do not see her disability and shortness of breath while walking.  She is also declining gait with handicap parking for this.    Patient has known COPD, severe status by Gold classification.  She has uncontrolled hypertension.  She is not wanting to pay for medications.  We have discussed the risks at length about the impact on her health short-term, and long-term.  She has a DO NOT RESUSCITATE on file.    Patient is needing refill of her medications, she is here for evaluation as she was told to have an emergency department visit last month with her previous COPD exacerbation.    At her last visit we had switched  "her to Symbicort twice daily with as needed daytime use for emergency/rescue inhaler use.  She was continuing to combine this with her Combivent.  She did start her Spiriva again, and felt that she had the best control of her symptoms with twice daily use of Spiriva.  She continues to use a, but nebulizer in the mornings.  She did have to replace her nebulizer machine and this is not working.  She needs a refill for this medication.    Chronic dyspnea on exertion.  She is noting that she is doing less activity around her house and is sitting more as she is becoming more short of breath.  Patient states she does have portable oxygen in her home.            Review of Systems  Constitutional, HEENT, cardiovascular, pulmonary, gi and gu systems are negative, except as otherwise noted.      Objective    BP (!) 168/94   Pulse 83   Temp 98.2  F (36.8  C) (Temporal)   Resp 24   Ht 1.563 m (5' 1.54\")   Wt 87 kg (191 lb 11.2 oz)   LMP  (LMP Unknown)   SpO2 93%   BMI 35.59 kg/m    Body mass index is 35.59 kg/m .  Physical Exam   GENERAL: alert and no distress  EYES: Eyes grossly normal to inspection  HENT: normal cephalic/atraumatic, ear canals and TM's normal, nose and mouth without ulcers or lesions, oral mucous membranes moist, and oropharynx and soft palate with some erythema consistent with chronic cough, potential thrush.  No white spots seen.  NECK: no adenopathy, no asymmetry, masses, or scars  RESP: I do not hear any consolidation or rales.  No wheezes currently.  Diminished breath sounds throughout.  Patient has pursed lip breathing prior to resting after walking.  CV: regular rates and rhythm, normal S1 S2, no S3 or S4, and no peripheral edema  MS: no gross musculoskeletal defects noted, no edema  NEURO: Normal strength and tone, mentation intact and speech normal  PSYCH: mentation appears normal, affect normal/bright    CXR - Reviewed and interpreted by me Normal- no infiltrates, effusions, pneumothoraces, " cardiomegaly or masses        Signed Electronically by: DEANNE Chen CNP

## 2024-07-25 NOTE — RESULT ENCOUNTER NOTE
Labs currently stable.  No evidence of elevated D-dimer for blood clotting.  Sincerely,   Susan Varma DNP

## 2024-07-26 ENCOUNTER — TELEPHONE (OUTPATIENT)
Dept: FAMILY MEDICINE | Facility: CLINIC | Age: 64
End: 2024-07-26
Payer: COMMERCIAL

## 2024-07-26 VITALS
HEIGHT: 62 IN | HEART RATE: 88 BPM | SYSTOLIC BLOOD PRESSURE: 162 MMHG | OXYGEN SATURATION: 93 % | RESPIRATION RATE: 24 BRPM | BODY MASS INDEX: 35.28 KG/M2 | TEMPERATURE: 98.2 F | WEIGHT: 191.7 LBS | DIASTOLIC BLOOD PRESSURE: 90 MMHG

## 2024-07-26 DIAGNOSIS — J44.9 CHRONIC OBSTRUCTIVE PULMONARY DISEASE, UNSPECIFIED COPD TYPE (H): ICD-10-CM

## 2024-07-26 RX ORDER — IPRATROPIUM BROMIDE AND ALBUTEROL SULFATE 2.5; .5 MG/3ML; MG/3ML
SOLUTION RESPIRATORY (INHALATION)
Qty: 90 ML | Refills: 0 | Status: SHIPPED | OUTPATIENT
Start: 2024-07-26

## 2024-07-26 NOTE — TELEPHONE ENCOUNTER
ipratropium-albuterol (COMBIVENT RESPIMAT)  MCG/ACT inhaler 3 each 3 2024 -- No   Si puffs 3-4 times daily   Sent to pharmacy as: Ipratropium-Albuterol  MCG/ACT Inhalation Aerosol Solution (COMBIVENT RESPIMAT)   Class: E-Prescribe   Order: [id number]   E-Prescribing Status: Transmission to pharmacy failed (2024  9:38 AM CDT)       Resending to pharmacy since transmission failed.     Brea Lopez, BSN, RN

## 2024-08-22 ENCOUNTER — TELEPHONE (OUTPATIENT)
Dept: FAMILY MEDICINE | Facility: CLINIC | Age: 64
End: 2024-08-22
Payer: COMMERCIAL

## 2024-08-22 NOTE — TELEPHONE ENCOUNTER
Patient Quality Outreach    Patient is due for the following:   Diabetes -  A1C, LDL (Fasting), Eye Exam, Microalbumin, BP Check, and Foot Exam  Hypertension -  BP check  Colon Cancer Screening  Breast Cancer Screening - Mammogram  Physical Preventive Adult Physical      Topic Date Due    Zoster (Shingles) Vaccine (1 of 2) Never done    COVID-19 Vaccine (3 - 2023-24 season) 09/01/2023       Next Steps:   Schedule a Adult Preventative    Type of outreach:    Call to schedule annual physical with fasting labs.   Call in 1 week if not read/completed; send letter in 2 weeks if not returned/read.       Questions for provider review:    None           Alejandrina Underwood, [facility]  Chart routed to Care Team.

## 2024-08-22 NOTE — TELEPHONE ENCOUNTER
"Staff called and spoke with patient. Patient reports that she \"just saw Susan\" and declines scheduling at this time  "

## 2024-12-02 DIAGNOSIS — J43.2 CENTRILOBULAR EMPHYSEMA (H): ICD-10-CM

## 2024-12-02 RX ORDER — TIOTROPIUM BROMIDE 18 UG/1
CAPSULE ORAL; RESPIRATORY (INHALATION)
Qty: 90 CAPSULE | Refills: 0 | Status: SHIPPED | OUTPATIENT
Start: 2024-12-02

## 2025-02-04 ENCOUNTER — NURSE TRIAGE (OUTPATIENT)
Dept: FAMILY MEDICINE | Facility: CLINIC | Age: 65
End: 2025-02-04
Payer: COMMERCIAL

## 2025-02-04 DIAGNOSIS — J44.1 COPD EXACERBATION (H): ICD-10-CM

## 2025-02-04 DIAGNOSIS — J43.9 PULMONARY EMPHYSEMA, UNSPECIFIED EMPHYSEMA TYPE (H): ICD-10-CM

## 2025-02-04 NOTE — TELEPHONE ENCOUNTER
Patient called to request refill of ipratropium due cold, needing more only 1 vial left.  Triage call followed due to symptoms reported during call. Patient scheduled for 2/6/25 with PCP     Ximena Akhtar RN on 2/4/2025 at 9:11 AM    Reason for Disposition   Sore throat present > 5 days   Cough has been present for > 3 weeks    Additional Information   Negative: SEVERE difficulty breathing (e.g., struggling for each breath, speaks in single words)   Negative: Very weak (can't stand)   Negative: Sounds like a life-threatening emergency to the triager   Negative: Symptoms of COVID-19 (e.g., cough, fever, SOB, or others) and COVID-19 is widespread in the community   Negative: Symptoms of COVID-19 (e.g., cough, fever, SOB, or others) and within 14 days of COVID-19 EXPOSURE   Negative: Symptoms of FLU (e.g., cough, runny nose, SOB, sore throat; with or without fever) and within 14 days of EXPOSURE (close contact) with someone diagnosed with influenza (e.g., flu test positive)   Negative: Difficulty breathing and not from stuffy nose (e.g., not relieved by cleaning out the nose)   Negative: Runny nose is caused by pollen or other allergies   Negative: Cough is main symptom   Negative: Sore throat is main symptom   Negative: Patient sounds very sick or weak to the triager   Negative: Fever > 103 F (39.4 C)   Negative: Fever > 101 F (38.3 C) and over 60 years of age   Negative: Fever > 100 F (37.8 C) and has diabetes mellitus or a weak immune system (e.g., HIV positive, cancer chemotherapy, organ transplant, splenectomy, chronic steroids)   Negative: Fever > 100 F (37.8 C) and bedridden (e.g., CVA, chronic illness, recovering from surgery)   Negative: Fever present > 3 days (72 hours)   Negative: Fever returns after gone for over 24 hours and symptoms worse or not improved   Negative: Sinus pain (not just congestion) and fever   Negative: Earache   Negative: Sinus congestion (pressure, fullness) present > 10 days    Negative: Nasal discharge present > 10 days   Negative: Using nasal washes and pain medicine > 24 hours and sinus pain (lower forehead, cheekbone, or eye) persists   Negative: Patient wants to be seen   Negative: SEVERE difficulty breathing (e.g., struggling for each breath, speaks in single words)   Negative: Bluish (or gray) lips or face now   Negative: [1] Difficulty breathing AND [2] exposure to flames, smoke, or fumes   Negative: [1] Stridor AND [2] difficulty breathing   Negative: Sounds like a life-threatening emergency to the triager   Negative: [1] Previous asthma attacks AND [2] this feels like asthma attack   Negative: Dry cough (non-productive;  no sputum or minimal clear sputum)   Negative: [1] MODERATE difficulty breathing (e.g., speaks in phrases, SOB even at rest, pulse 100-120) AND [2] still present when not coughing   Negative: Chest pain  (Exception: MILD central chest pain, present only when coughing.)   Negative: Patient sounds very sick or weak to the triager   Negative: [1] MILD difficulty breathing (e.g., minimal/no SOB at rest, SOB with walking, pulse <100) AND [2] still present when not coughing   Negative: [1] Coughed up blood AND [2] > 1 tablespoon (15 ml)   (Exception: Blood-tinged sputum.)   Negative: Fever > 103 F (39.4 C)   Negative: [1] Fever > 101 F (38.3 C) AND [2] age > 60 years   Negative: [1] Fever > 100.0 F (37.8 C) AND [2] bedridden (e.g., CVA, chronic illness, recovering from surgery)   Negative: [1] Fever > 100.0 F (37.8 C) AND [2] diabetes mellitus or weak immune system (e.g., HIV positive, cancer chemo, splenectomy, organ transplant, chronic steroids)   Negative: Wheezing is present   Negative: SEVERE coughing spells (e.g., whooping sound after coughing, vomiting after coughing)   Negative: [1] Continuous (nonstop) coughing interferes with work or school AND [2] no improvement using cough treatment per Care Advice   Negative: Coughing up fani-colored (reddish-brown)  "sputum   Negative: Fever present > 3 days (72 hours)   Negative: [1] Fever returns after gone for over 24 hours AND [2] symptoms worse or not improved   Negative: [1] Using nasal washes and pain medicine > 24 hours AND [2] sinus pain (around cheekbone or eye) persists   Negative: Earache   Negative: [1] Known COPD or other severe lung disease (i.e., bronchiectasis, cystic fibrosis, lung surgery) AND [2] worsening symptoms (i.e., increased sputum purulence or amount, increased breathing difficulty    Answer Assessment - Initial Assessment Questions  1. ONSET: \"When did the nasal discharge start?\"       None  2. AMOUNT: \"How much discharge is there?\"      none  3. COUGH: \"Do you have a cough?\" If Yes, ask: \"Describe the color of your mucus.\" (e.g., clear, white, yellow, green)      Started 2nd week of January,  Clear- due to smoking  4. RESPIRATORY DISTRESS: \"Describe your breathing.\"       No  5. FEVER: \"Do you have a fever?\" If Yes, ask: \"What is your temperature, how was it measured, and when did it start?\"      No  6. SEVERITY: \"Overall, how bad are you feeling right now?\" (e.g., doesn't interfere with normal activities, staying home from school/work, staying in bed)       Feeling drained, and SOB with exertion  7. OTHER SYMPTOMS: \"Do you have any other symptoms?\" (e.g., earache, mouth sores, sore throat, wheezing)      Sore throat, wheezing  8. PREGNANCY: \"Is there any chance you are pregnant?\" \"When was your last menstrual period?\"      no    Answer Assessment - Initial Assessment Questions  1. ONSET: \"When did the cough begin?\"     2nd week of January  2. SEVERITY: \"How bad is the cough today?\"       Moderate  3. SPUTUM: \"Describe the color of your sputum\" (none, dry cough; clear, white, yellow, green)      Clear to Black patient reports being a moderate smoker  4. HEMOPTYSIS: \"Are you coughing up any blood?\" If so ask: \"How much?\" (flecks, streaks, tablespoons, etc.)      no  5. DIFFICULTY BREATHING: \"Are you " "having difficulty breathing?\" If Yes, ask: \"How bad is it?\" (e.g., mild, moderate, severe)     - MILD: No SOB at rest, mild SOB with walking, speaks normally in sentences, can lie down, no retractions, pulse < 100.     - MODERATE: SOB at rest, SOB with minimal exertion and prefers to sit, cannot lie down flat, speaks in phrases, mild retractions, audible wheezing, pulse 100-120.     - SEVERE: Very SOB at rest, speaks in single words, struggling to breathe, sitting hunched forward, retractions, pulse > 120       Moderate when walking 20ft (Hallway length)  6. FEVER: \"Do you have a fever?\" If Yes, ask: \"What is your temperature, how was it measured, and when did it start?\"    no  7. CARDIAC HISTORY: \"Do you have any history of heart disease?\" (e.g., heart attack, congestive heart failure)      No  8. LUNG HISTORY: \"Do you have any history of lung disease?\"  (e.g., pulmonary embolus, asthma, emphysema)      COPD  9. PE RISK FACTORS: \"Do you have a history of blood clots?\" (or: recent major surgery, recent prolonged travel, bedridden)      no  10. OTHER SYMPTOMS: \"Do you have any other symptoms?\" (e.g., runny nose, wheezing, chest pain)       Wheezing, sore throat, fatigue  11. PREGNANCY: \"Is there any chance you are pregnant?\" \"When was your last menstrual period?\"        no  12. TRAVEL: \"Have you traveled out of the country in the last month?\" (e.g., travel history, exposures)        no    Protocols used: Common Cold-A-OH, Cough - Acute [id number]-M-UW    Per RN protocol advised patient to be seen  Advised patient of home care instructions per care advice. Patient scheduled for visit on 2/6/25 at 11am with 1040am.   patient was informed to arrived 20 minutes prior to scheduled visit for check-in. Patient advised to seek urgent care or ED for worsening symptoms per protocol. Patient expressed verbal understanding.     Ximena Akhtar RN on 2/4/2025 at 9:24 AM    "

## 2025-02-05 RX ORDER — IPRATROPIUM BROMIDE AND ALBUTEROL SULFATE 2.5; .5 MG/3ML; MG/3ML
SOLUTION RESPIRATORY (INHALATION)
Qty: 90 ML | Refills: 0 | Status: SHIPPED | OUTPATIENT
Start: 2025-02-05

## 2025-03-02 DIAGNOSIS — J43.2 CENTRILOBULAR EMPHYSEMA (H): ICD-10-CM

## 2025-03-03 RX ORDER — TIOTROPIUM BROMIDE 18 UG/1
CAPSULE ORAL; RESPIRATORY (INHALATION)
Qty: 30 CAPSULE | Refills: 0 | Status: SHIPPED | OUTPATIENT
Start: 2025-03-03

## 2025-03-05 ENCOUNTER — TELEPHONE (OUTPATIENT)
Dept: FAMILY MEDICINE | Facility: CLINIC | Age: 65
End: 2025-03-05
Payer: COMMERCIAL

## 2025-03-05 DIAGNOSIS — J44.1 COPD EXACERBATION (H): Primary | ICD-10-CM

## 2025-03-05 NOTE — TELEPHONE ENCOUNTER
Drug Change Request    tiotropium (SPIRIVA HANDIHALER) 18 MCG inhaled capsule     Pharmacy message: drug not covered by patient plan. The preferred alternative is SPIRIVACAPHANDIHLR, TUDORZAPRESAERACT, INCRUSEELPTINHMCG    Please call/fax the pharmacy to change medication along with strength, directions, quantity, and refills.    Luiz Olson, ROSEMARYT on 3/5/2025 at 12:25 PM

## 2025-03-10 ENCOUNTER — TELEPHONE (OUTPATIENT)
Dept: FAMILY MEDICINE | Facility: CLINIC | Age: 65
End: 2025-03-10
Payer: COMMERCIAL

## 2025-03-10 NOTE — TELEPHONE ENCOUNTER
Patient Quality Outreach    Patient is due for the following:   Diabetes -  A1C, Eye Exam, Microalbumin, and Foot Exam  Hypertension -  Hypertension follow-up visit  Physical Preventive Adult Physical    Action(s) Taken:   Schedule a Adult Preventative    Type of outreach:    Phone, left message for patient/parent to call back.    Questions for provider review:    None           Kath Demarco MA  Chart routed to Care Team.

## 2025-03-10 NOTE — LETTER
[facility]  [address], [address]  [place] [address]  Phone: [phone]  Fax: [phone]  March 25, 2025      Yarely Dia  [address] [address] [place] [id number]      Dear Yarely,    We care about your health and have reviewed your health plan including your medical conditions, medications, and lab results.  Based on this review, it is recommended that you follow up regarding the following health topic(s):  -Wellness (Physical) Visit     We recommend you take the following action(s):  -schedule a WELLNESS (Physical) APPOINTMENT.  We will perform the following labs: A1c.     Please call us at the [facility] [phone] (or use [employer]) to address the above recommendations.     Thank you for trusting [facility] and we appreciate the opportunity to serve you.  We look forward to supporting your healthcare needs in the future.    Healthy Regards,    Your Health Care Team  [facility]

## 2025-04-17 ENCOUNTER — VIRTUAL VISIT (OUTPATIENT)
Dept: FAMILY MEDICINE | Facility: CLINIC | Age: 65
End: 2025-04-17
Payer: COMMERCIAL

## 2025-04-17 DIAGNOSIS — B37.0 THRUSH: ICD-10-CM

## 2025-04-17 DIAGNOSIS — E11.65 TYPE 2 DIABETES MELLITUS WITH HYPERGLYCEMIA, WITHOUT LONG-TERM CURRENT USE OF INSULIN (H): ICD-10-CM

## 2025-04-17 DIAGNOSIS — J43.2 CENTRILOBULAR EMPHYSEMA (H): Primary | ICD-10-CM

## 2025-04-17 DIAGNOSIS — R60.0 LOCALIZED EDEMA: ICD-10-CM

## 2025-04-17 DIAGNOSIS — I10 PRIMARY HYPERTENSION: ICD-10-CM

## 2025-04-17 DIAGNOSIS — Z13.9 ENCOUNTER FOR SCREENING INVOLVING SOCIAL DETERMINANTS OF HEALTH (SDOH): ICD-10-CM

## 2025-04-17 RX ORDER — IPRATROPIUM BROMIDE AND ALBUTEROL SULFATE 2.5; .5 MG/3ML; MG/3ML
SOLUTION RESPIRATORY (INHALATION)
Qty: 90 ML | Refills: 1 | Status: SHIPPED | OUTPATIENT
Start: 2025-04-17

## 2025-04-17 RX ORDER — FLUTICASONE PROPIONATE AND SALMETEROL 500; 50 UG/1; UG/1
1 POWDER RESPIRATORY (INHALATION) EVERY 12 HOURS
Qty: 180 EACH | Refills: 1 | Status: SHIPPED | OUTPATIENT
Start: 2025-04-17 | End: 2025-04-17

## 2025-04-17 RX ORDER — CLOTRIMAZOLE 10 MG/1
10 LOZENGE ORAL
Qty: 70 TROCHE | Refills: 0 | Status: SHIPPED | OUTPATIENT
Start: 2025-04-17

## 2025-04-17 RX ORDER — FLUTICASONE PROPIONATE AND SALMETEROL 500; 50 UG/1; UG/1
1 POWDER RESPIRATORY (INHALATION) EVERY 12 HOURS
Qty: 180 EACH | Refills: 1 | Status: SHIPPED | OUTPATIENT
Start: 2025-04-17

## 2025-04-17 NOTE — PROGRESS NOTES
Yarely is a 64 year old who is being evaluated via a billable telephone visit.    What phone number would you like to be contacted at? [phone]   How would you like to obtain your AVS? Mail a copy  Originating Location (pt. Location): Home    Distant Location (provider location):  Off-site  Telephone visit completed due to the patient did not consent to a video visit.    Assessment & Plan     Assessment & Plan  Thrush:  - Refill prescribed.    Centrilobular emphysema (H):  - Concern about breathing and oxygen levels, especially during respiratory infections.  - Maintain current medications. Consider using oxygen more frequently. Discussed potential use of an Inogen for travel.    Primary hypertension:  - Blood pressure previously elevated with Symbicort use; improved with medication change.  - Continue current medication regimen. No blood pressure medication prescribed.    Type 2 diabetes mellitus with hyperglycemia:  - No current issues with blood sugar levels. Occasional swelling in feet related to diet.    Discussed her chronic disease. She is aware of her severity. Not wanting cancer screenings, minimal intervention. Continues to work. Does not want specialty intervention. High costs, and is not assuming it will really prolong things much for her.   She is happy with minimal interventions outside of her breathing medications.     Continue home Oxygen.         Nutrition and Exercise:  - Yarely maintains her health by eating right and traveling with her sister.  - Discussed the importance of healthy eating to manage swelling and overall health.    The longitudinal plan of care for the diagnosis(es)/condition(s) as documented were addressed during this visit. Due to the added complexity in care, I will continue to support the patient in the subsequent management and with ongoing continuity of care.    AI and/or dictation software was used for the creation of the note.          Nicotine/Tobacco Cessation  She  "reports that she has been smoking cigarettes. She started smoking about 45 years ago. She has a 45.9 pack-year smoking history. She has never used smokeless tobacco.  Nicotine/Tobacco Cessation Plan  Information offered: Patient not interested at this time      BMI  Estimated body mass index is 35.59 kg/m  as calculated from the following:    Height as of 7/25/24: 1.563 m (5' 1.54\").    Weight as of 7/25/24: 87 kg (191 lb 11.2 oz).   Weight management plan: healthy habits as able.       6 month check.       Aiden Pritchett is a 64 year old, presenting for the following health issues:  Recheck Medication and COPD        4/17/2025     1:20 PM   Additional Questions   Roomed by Tio BULLOCK   Accompanied by Self     History of Present Illness       Reason for visit:  COPD   She is taking medications regularly.        COPD Follow-Up  Overall, how are your COPD symptoms since your last clinic visit?  Slightly worse  How much fatigue or shortness of breath do you have when you are walking?  More than usual  How much shortness of breath do you have when you are resting?  Same as usual  How often do you cough? Often  Have you noticed any change in your sputum/phlegm?  No  Have you experienced a recent fever? No  Please describe how far you can walk without stopping to rest:  The length of 1-2 rooms  How many flights of stairs are you able to walk up without stopping?  1  Have you had any Emergency Room Visits, Urgent Care Visits, or Hospital Admissions because of your COPD since your last office visit?  No    History   Smoking Status    Every Day    Packs/day: 1.00    Years: 39.00    Types: Cigarettes    Start date: 6/1/1979   Smokeless Tobacco    Never     No results found for: \"FEV1\", \"VPN8PQF\"          Review of Systems  Constitutional, HEENT, cardiovascular, pulmonary, gi and gu systems are negative, except as otherwise noted.      Objective           Vitals:  No vitals were obtained today due to virtual " visit.    Physical Exam   General: Alert and no distress //Respiratory: No audible wheeze, cough, or shortness of breath // Psychiatric:  Appropriate affect, tone, and pace of words            Phone call duration: > 19 minutes  Signed Electronically by: DEANNE Chen CNP

## 2025-05-13 DIAGNOSIS — J43.2 CENTRILOBULAR EMPHYSEMA (H): ICD-10-CM

## 2025-05-13 RX ORDER — TIOTROPIUM BROMIDE 18 UG/1
CAPSULE ORAL; RESPIRATORY (INHALATION)
Qty: 30 CAPSULE | Refills: 2 | Status: SHIPPED | OUTPATIENT
Start: 2025-05-13

## 2025-08-16 DIAGNOSIS — J43.2 CENTRILOBULAR EMPHYSEMA (H): ICD-10-CM

## 2025-08-18 RX ORDER — TIOTROPIUM BROMIDE 18 UG/1
CAPSULE ORAL; RESPIRATORY (INHALATION)
Qty: 30 CAPSULE | Refills: 2 | Status: SHIPPED | OUTPATIENT
Start: 2025-08-18